# Patient Record
Sex: MALE | Race: WHITE | Employment: FULL TIME | URBAN - METROPOLITAN AREA
[De-identification: names, ages, dates, MRNs, and addresses within clinical notes are randomized per-mention and may not be internally consistent; named-entity substitution may affect disease eponyms.]

---

## 2017-12-06 ENCOUNTER — APPOINTMENT (OUTPATIENT)
Dept: GENERAL RADIOLOGY | Age: 62
DRG: 189 | End: 2017-12-06
Attending: EMERGENCY MEDICINE
Payer: SUBSIDIZED

## 2017-12-06 ENCOUNTER — HOSPITAL ENCOUNTER (INPATIENT)
Age: 62
LOS: 1 days | Discharge: HOME | DRG: 189 | End: 2017-12-07
Attending: EMERGENCY MEDICINE | Admitting: INTERNAL MEDICINE
Payer: SUBSIDIZED

## 2017-12-06 DIAGNOSIS — R06.02 SOB (SHORTNESS OF BREATH): Primary | ICD-10-CM

## 2017-12-06 DIAGNOSIS — R09.02 HYPOXIA: ICD-10-CM

## 2017-12-06 PROBLEM — R06.00 DYSPNEA: Status: ACTIVE | Noted: 2017-12-06

## 2017-12-06 PROBLEM — I10 HTN (HYPERTENSION), BENIGN: Status: ACTIVE | Noted: 2017-12-06

## 2017-12-06 LAB
ALBUMIN SERPL-MCNC: 4 G/DL (ref 3.5–5)
ALBUMIN/GLOB SERPL: 1.1 {RATIO} (ref 1.1–2.2)
ALP SERPL-CCNC: 96 U/L (ref 45–117)
ALT SERPL-CCNC: 35 U/L (ref 12–78)
ANION GAP SERPL CALC-SCNC: 8 MMOL/L (ref 5–15)
AST SERPL-CCNC: 25 U/L (ref 15–37)
ATRIAL RATE: 68 BPM
B PERT DNA SPEC QL NAA+PROBE: NOT DETECTED
BASOPHILS # BLD: 0.1 K/UL (ref 0–0.1)
BASOPHILS NFR BLD: 1 % (ref 0–1)
BILIRUB SERPL-MCNC: 0.5 MG/DL (ref 0.2–1)
BUN SERPL-MCNC: 14 MG/DL (ref 6–20)
BUN/CREAT SERPL: 16 (ref 12–20)
C PNEUM DNA SPEC QL NAA+PROBE: NOT DETECTED
CALCIUM SERPL-MCNC: 9.5 MG/DL (ref 8.5–10.1)
CALCULATED P AXIS, ECG09: 47 DEGREES
CALCULATED R AXIS, ECG10: 62 DEGREES
CALCULATED T AXIS, ECG11: 62 DEGREES
CHLORIDE SERPL-SCNC: 101 MMOL/L (ref 97–108)
CO2 SERPL-SCNC: 27 MMOL/L (ref 21–32)
CREAT SERPL-MCNC: 0.89 MG/DL (ref 0.7–1.3)
DIAGNOSIS, 93000: NORMAL
EOSINOPHIL # BLD: 0.9 K/UL (ref 0–0.4)
EOSINOPHIL NFR BLD: 14 % (ref 0–7)
ERYTHROCYTE [DISTWIDTH] IN BLOOD BY AUTOMATED COUNT: 12.6 % (ref 11.5–14.5)
FLUAV AG NPH QL IA: NEGATIVE
FLUAV H1 2009 PAND RNA SPEC QL NAA+PROBE: NOT DETECTED
FLUAV H1 RNA SPEC QL NAA+PROBE: NOT DETECTED
FLUAV H3 RNA SPEC QL NAA+PROBE: NOT DETECTED
FLUAV SUBTYP SPEC NAA+PROBE: NOT DETECTED
FLUBV AG NOSE QL IA: NEGATIVE
FLUBV RNA SPEC QL NAA+PROBE: NOT DETECTED
GLOBULIN SER CALC-MCNC: 3.5 G/DL (ref 2–4)
GLUCOSE SERPL-MCNC: 100 MG/DL (ref 65–100)
HADV DNA SPEC QL NAA+PROBE: NOT DETECTED
HCOV 229E RNA SPEC QL NAA+PROBE: NOT DETECTED
HCOV HKU1 RNA SPEC QL NAA+PROBE: NOT DETECTED
HCOV NL63 RNA SPEC QL NAA+PROBE: NOT DETECTED
HCOV OC43 RNA SPEC QL NAA+PROBE: NOT DETECTED
HCT VFR BLD AUTO: 44.4 % (ref 36.6–50.3)
HGB BLD-MCNC: 16.2 G/DL (ref 12.1–17)
HMPV RNA SPEC QL NAA+PROBE: NOT DETECTED
HPIV1 RNA SPEC QL NAA+PROBE: NOT DETECTED
HPIV2 RNA SPEC QL NAA+PROBE: NOT DETECTED
HPIV3 RNA SPEC QL NAA+PROBE: NOT DETECTED
HPIV4 RNA SPEC QL NAA+PROBE: NOT DETECTED
LYMPHOCYTES # BLD: 1.6 K/UL (ref 0.8–3.5)
LYMPHOCYTES NFR BLD: 24 % (ref 12–49)
M PNEUMO DNA SPEC QL NAA+PROBE: NOT DETECTED
MAGNESIUM SERPL-MCNC: 2.1 MG/DL (ref 1.6–2.4)
MCH RBC QN AUTO: 32.7 PG (ref 26–34)
MCHC RBC AUTO-ENTMCNC: 36.5 G/DL (ref 30–36.5)
MCV RBC AUTO: 89.5 FL (ref 80–99)
MONOCYTES # BLD: 0.5 K/UL (ref 0–1)
MONOCYTES NFR BLD: 7 % (ref 5–13)
NEUTS SEG # BLD: 3.7 K/UL (ref 1.8–8)
NEUTS SEG NFR BLD: 54 % (ref 32–75)
P-R INTERVAL, ECG05: 146 MS
PLATELET # BLD AUTO: 244 K/UL (ref 150–400)
POTASSIUM SERPL-SCNC: 4.4 MMOL/L (ref 3.5–5.1)
PROT SERPL-MCNC: 7.5 G/DL (ref 6.4–8.2)
Q-T INTERVAL, ECG07: 372 MS
QRS DURATION, ECG06: 70 MS
QTC CALCULATION (BEZET), ECG08: 395 MS
RBC # BLD AUTO: 4.96 M/UL (ref 4.1–5.7)
RSV RNA SPEC QL NAA+PROBE: NOT DETECTED
RV+EV RNA SPEC QL NAA+PROBE: NOT DETECTED
SODIUM SERPL-SCNC: 136 MMOL/L (ref 136–145)
VENTRICULAR RATE, ECG03: 68 BPM
WBC # BLD AUTO: 6.8 K/UL (ref 4.1–11.1)

## 2017-12-06 PROCEDURE — 83735 ASSAY OF MAGNESIUM: CPT | Performed by: EMERGENCY MEDICINE

## 2017-12-06 PROCEDURE — 77010033678 HC OXYGEN DAILY

## 2017-12-06 PROCEDURE — 71010 XR CHEST PORT: CPT

## 2017-12-06 PROCEDURE — 77030027138 HC INCENT SPIROMETER -A

## 2017-12-06 PROCEDURE — 94761 N-INVAS EAR/PLS OXIMETRY MLT: CPT

## 2017-12-06 PROCEDURE — 74011250636 HC RX REV CODE- 250/636: Performed by: INTERNAL MEDICINE

## 2017-12-06 PROCEDURE — 80053 COMPREHEN METABOLIC PANEL: CPT | Performed by: EMERGENCY MEDICINE

## 2017-12-06 PROCEDURE — 36415 COLL VENOUS BLD VENIPUNCTURE: CPT | Performed by: EMERGENCY MEDICINE

## 2017-12-06 PROCEDURE — 74011250636 HC RX REV CODE- 250/636: Performed by: EMERGENCY MEDICINE

## 2017-12-06 PROCEDURE — 87804 INFLUENZA ASSAY W/OPTIC: CPT | Performed by: EMERGENCY MEDICINE

## 2017-12-06 PROCEDURE — 74011250637 HC RX REV CODE- 250/637: Performed by: INTERNAL MEDICINE

## 2017-12-06 PROCEDURE — 74011250637 HC RX REV CODE- 250/637: Performed by: NURSE PRACTITIONER

## 2017-12-06 PROCEDURE — 94640 AIRWAY INHALATION TREATMENT: CPT

## 2017-12-06 PROCEDURE — 96374 THER/PROPH/DIAG INJ IV PUSH: CPT

## 2017-12-06 PROCEDURE — 74011250636 HC RX REV CODE- 250/636: Performed by: NURSE PRACTITIONER

## 2017-12-06 PROCEDURE — 74011000250 HC RX REV CODE- 250: Performed by: EMERGENCY MEDICINE

## 2017-12-06 PROCEDURE — 77030013140 HC MSK NEB VYRM -A

## 2017-12-06 PROCEDURE — 94762 N-INVAS EAR/PLS OXIMTRY CONT: CPT

## 2017-12-06 PROCEDURE — 85025 COMPLETE CBC W/AUTO DIFF WBC: CPT | Performed by: EMERGENCY MEDICINE

## 2017-12-06 PROCEDURE — 87798 DETECT AGENT NOS DNA AMP: CPT | Performed by: NURSE PRACTITIONER

## 2017-12-06 PROCEDURE — 93005 ELECTROCARDIOGRAM TRACING: CPT

## 2017-12-06 PROCEDURE — 65660000000 HC RM CCU STEPDOWN

## 2017-12-06 PROCEDURE — 99284 EMERGENCY DEPT VISIT MOD MDM: CPT

## 2017-12-06 PROCEDURE — 74011000250 HC RX REV CODE- 250: Performed by: INTERNAL MEDICINE

## 2017-12-06 RX ORDER — ACETAMINOPHEN 325 MG/1
650 TABLET ORAL
Status: DISCONTINUED | OUTPATIENT
Start: 2017-12-06 | End: 2017-12-07 | Stop reason: HOSPADM

## 2017-12-06 RX ORDER — IPRATROPIUM BROMIDE AND ALBUTEROL SULFATE 2.5; .5 MG/3ML; MG/3ML
3 SOLUTION RESPIRATORY (INHALATION)
Status: DISCONTINUED | OUTPATIENT
Start: 2017-12-06 | End: 2017-12-07 | Stop reason: HOSPADM

## 2017-12-06 RX ORDER — PROCHLORPERAZINE EDISYLATE 5 MG/ML
5 INJECTION INTRAMUSCULAR; INTRAVENOUS
Status: DISCONTINUED | OUTPATIENT
Start: 2017-12-06 | End: 2017-12-06 | Stop reason: CLARIF

## 2017-12-06 RX ORDER — SODIUM CHLORIDE 0.9 % (FLUSH) 0.9 %
5-10 SYRINGE (ML) INJECTION EVERY 8 HOURS
Status: DISCONTINUED | OUTPATIENT
Start: 2017-12-06 | End: 2017-12-07 | Stop reason: HOSPADM

## 2017-12-06 RX ORDER — HYDRALAZINE HYDROCHLORIDE 20 MG/ML
10 INJECTION INTRAMUSCULAR; INTRAVENOUS
Status: DISCONTINUED | OUTPATIENT
Start: 2017-12-06 | End: 2017-12-07 | Stop reason: HOSPADM

## 2017-12-06 RX ORDER — ENOXAPARIN SODIUM 100 MG/ML
40 INJECTION SUBCUTANEOUS EVERY 24 HOURS
Status: DISCONTINUED | OUTPATIENT
Start: 2017-12-06 | End: 2017-12-07 | Stop reason: HOSPADM

## 2017-12-06 RX ORDER — GUAIFENESIN 600 MG/1
600 TABLET, EXTENDED RELEASE ORAL EVERY 12 HOURS
Status: DISCONTINUED | OUTPATIENT
Start: 2017-12-06 | End: 2017-12-07 | Stop reason: HOSPADM

## 2017-12-06 RX ORDER — MORPHINE SULFATE 2 MG/ML
1 INJECTION, SOLUTION INTRAMUSCULAR; INTRAVENOUS
Status: DISCONTINUED | OUTPATIENT
Start: 2017-12-06 | End: 2017-12-07 | Stop reason: HOSPADM

## 2017-12-06 RX ORDER — VITAMIN E 1000 UNIT
1000 CAPSULE ORAL
COMMUNITY

## 2017-12-06 RX ORDER — SODIUM CHLORIDE 0.9 % (FLUSH) 0.9 %
5-10 SYRINGE (ML) INJECTION AS NEEDED
Status: DISCONTINUED | OUTPATIENT
Start: 2017-12-06 | End: 2017-12-07 | Stop reason: HOSPADM

## 2017-12-06 RX ORDER — NALOXONE HYDROCHLORIDE 0.4 MG/ML
0.4 INJECTION, SOLUTION INTRAMUSCULAR; INTRAVENOUS; SUBCUTANEOUS AS NEEDED
Status: DISCONTINUED | OUTPATIENT
Start: 2017-12-06 | End: 2017-12-07 | Stop reason: HOSPADM

## 2017-12-06 RX ADMIN — AZITHROMYCIN MONOHYDRATE 500 MG: 500 INJECTION, POWDER, LYOPHILIZED, FOR SOLUTION INTRAVENOUS at 14:52

## 2017-12-06 RX ADMIN — Medication 10 ML: at 06:47

## 2017-12-06 RX ADMIN — METHYLPREDNISOLONE SODIUM SUCCINATE 40 MG: 40 INJECTION, POWDER, FOR SOLUTION INTRAMUSCULAR; INTRAVENOUS at 21:02

## 2017-12-06 RX ADMIN — METHYLPREDNISOLONE SODIUM SUCCINATE 40 MG: 40 INJECTION, POWDER, FOR SOLUTION INTRAMUSCULAR; INTRAVENOUS at 06:47

## 2017-12-06 RX ADMIN — GUAIFENESIN 600 MG: 600 TABLET, EXTENDED RELEASE ORAL at 21:02

## 2017-12-06 RX ADMIN — ENOXAPARIN SODIUM 40 MG: 40 INJECTION SUBCUTANEOUS at 08:39

## 2017-12-06 RX ADMIN — METHYLPREDNISOLONE SODIUM SUCCINATE 40 MG: 40 INJECTION, POWDER, FOR SOLUTION INTRAMUSCULAR; INTRAVENOUS at 13:01

## 2017-12-06 RX ADMIN — IPRATROPIUM BROMIDE AND ALBUTEROL SULFATE 3 ML: .5; 3 SOLUTION RESPIRATORY (INHALATION) at 13:37

## 2017-12-06 RX ADMIN — Medication 10 ML: at 22:00

## 2017-12-06 RX ADMIN — Medication 10 ML: at 13:01

## 2017-12-06 RX ADMIN — IPRATROPIUM BROMIDE AND ALBUTEROL SULFATE 3 ML: .5; 3 SOLUTION RESPIRATORY (INHALATION) at 19:35

## 2017-12-06 RX ADMIN — METHYLPREDNISOLONE SODIUM SUCCINATE 125 MG: 125 INJECTION, POWDER, FOR SOLUTION INTRAMUSCULAR; INTRAVENOUS at 02:40

## 2017-12-06 RX ADMIN — ALBUTEROL SULFATE 1 DOSE: 2.5 SOLUTION RESPIRATORY (INHALATION) at 02:37

## 2017-12-06 RX ADMIN — GUAIFENESIN 600 MG: 600 TABLET, EXTENDED RELEASE ORAL at 14:53

## 2017-12-06 RX ADMIN — IPRATROPIUM BROMIDE AND ALBUTEROL SULFATE 3 ML: .5; 3 SOLUTION RESPIRATORY (INHALATION) at 08:08

## 2017-12-06 RX ADMIN — ACETAMINOPHEN 650 MG: 325 TABLET ORAL at 13:01

## 2017-12-06 RX ADMIN — ALBUTEROL SULFATE 1 DOSE: 2.5 SOLUTION RESPIRATORY (INHALATION) at 03:10

## 2017-12-06 NOTE — PROGRESS NOTES
12/7/2017 2:54 PM Pt's RN notified. No new needs. MANDO Laguerre     12/7/2017 2:46 PM SHELL spoke with Ryan Macias with [facility] regarding the pt's Care Card Application. Pt does not qualify for Medicaid. Pt's wife states that there is a 120 day hold on their application. Pt and wife have been instructed to contact [facility] to find out why and if there is additional information needed. No new needs. Armen Laguerre     12/7/2017 2:41 PM Discussed with the pt. Pt agreeable. Referral sent for [facility]. Armen Laguerre     12/7/2017 2:32 PM Orders received for [facility] for COPD. MANDO Laguerre     12/6/2017 5:22 PM Contacted Bridger Dillard scheduling this afternoon to arrange a PCP appointment. Pt has a new patient appointment for Monday December 11th at 11:00AM with NP Abbie Pringle. MANDO Laguerre     12/6/2017 5:13 PM CM spoke with pt to complete assessment and discuss discharge plans. Pt lives with his wife in a 1 story home with 2 steps to enter. Pt has confirmed his address and phone number are correct. Pt reports that prior to admission, he was independent with adls. Pt has not  been using DME. Previously, pt has not had home health. Pt does not have a PCP, but is agreeable to getting one. Pt gets meds filled at [address] [facility]. PCP appointment is the discharge need anticipated. Plan is for transport home by his family. Pt confirms that he has been visited by a financial screener. CM will continue to follow and assist as needed.     MANDO Laguerre

## 2017-12-06 NOTE — PROGRESS NOTES
Primary Nurse Osvaldo Stovall RN and Julio Caro RN performed a dual skin assessment on this patient No impairment noted  Rigo score is 23

## 2017-12-06 NOTE — ED NOTES
Bedside and Verbal shift change report given to Yumiko Bennett RN (oncoming nurse) by Carlos Agarwal RN (offgoing nurse). Report included the following information SBAR, Kardex, MAR, Recent Results and Cardiac Rhythm NSR.

## 2017-12-06 NOTE — PROGRESS NOTES
TRANSFER - IN REPORT:    Verbal report received from HIRA Bolivar(name) on Kristi Berumen  being received from ED(unit) for routine progression of care      Report consisted of patients Situation, Background, Assessment and   Recommendations(SBAR). Information from the following report(s) SBAR, Kardex, ED Summary, Procedure Summary, Intake/Output, MAR, Recent Results and Med Rec Status was reviewed with the receiving nurse. Opportunity for questions and clarification was provided. Assessment completed upon patients arrival to unit and care assumed.

## 2017-12-06 NOTE — IP AVS SNAPSHOT
Segundo Contreras 
 
 
 [address] 
[phone] Patient: Veena Lo MRN: [id number] LWB:95/4/2071 My Medications TAKE these medications as instructed Instructions Each Dose to Equal  
 Morning Noon Evening Bedtime  
 albuterol sulfate 90 mcg/actuation Aepb Your last dose was: Your next dose is: Take 2 Puffs by inhalation four (4) times daily as needed. 2 Puff  
    
   
   
   
  
 azithromycin 250 mg tablet Commonly known as:  Gamal Ambrose Your last dose was: Your next dose is: Take 1 Tab by mouth daily for 4 days. 250 mg  
    
   
   
   
  
 guaiFENesin  mg ER tablet Commonly known as:  eegoes Your last dose was: Your next dose is: Take 1 Tab by mouth every twelve (12) hours for 10 days. 600 mg  
    
   
   
   
  
 multivitamin tablet Commonly known as:  ONE A DAY Your last dose was: Your next dose is: Take 1 Tab by mouth daily. 1 Tab OTHER Your last dose was: Your next dose is:    
   
   
 Olive leaf one capsule by mouth daily, Biocleanse one capsule by mouth every evening  
     
   
   
   
  
 predniSONE 20 mg tablet Commonly known as:  Yogesh Violettey Your last dose was: Your next dose is: Take 1 Tab by mouth two (2) times daily (with meals) for 3 days. 20 mg PROBIOTIC 4X 10-15 mg Tbec Generic drug:  B.infantis-B.ani-B.long-B.bifi Your last dose was: Your next dose is: Take 1 Cap by mouth nightly. 1 Cap VITAMIN C 1,000 mg tablet Generic drug:  ascorbic acid (vitamin C) Your last dose was: Your next dose is: Take 1,000 mg by mouth nightly. 1000 mg Where to Get Your Medications Information on where to get these meds will be given to you by the nurse or doctor. ! Ask your nurse or doctor about these medications  
  albuterol sulfate 90 mcg/actuation Aepb  
 azithromycin 250 mg tablet  
 guaiFENesin  mg ER tablet  
 predniSONE 20 mg tablet

## 2017-12-06 NOTE — CDMP QUERY
=>Underweight with BMI 18.7   =>Other Explanation of clinical findings  =>Unable to Determine (no explanation of clinical findings)    The medical record reflects the following clinical findings, treatment, and risk factors:  Risk Factors: 61 yo M admitted with dyspnea and hypoxia  Clinical Indicators: Ht 6'1 Wt 141 lb BMI 18   Treatment: Please specify if this pt is underweight or  has any nutritional deficiencies     Please clarify and document your clinical opinion in the progress notes and discharge summary including the definitive and/or presumptive diagnosis, (suspected or probable), related to the above clinical findings. Please include clinical findings supporting your diagnosis.     Thank you for your time   [facility] RN/BSN, [address] [place] [address]  Desk:   [phone]   Other:  [phone]

## 2017-12-06 NOTE — PROGRESS NOTES
12/06/17 1830 12/06/17 1832 12/06/17 1835   RT Walking Oximetry   Stage Resting (Room Air) During Walk (Room Air) During Walk (Room Air)   SpO2 96 % 95 % 93 %   HR 90 bpm 95 bpm 99 bpm   Rate of Dyspnea 0 0 0   O2 Device None (Room air) None (Room air) None (Room air)       12/06/17 1838 12/06/17 1839   RT Walking Oximetry   Stage During Walk (Room Air) After Walk   SpO2 94 % 95 %   HR 97 bpm 92 bpm   Rate of Dyspnea 1 0   O2 Device None (Room air) None (Room air)

## 2017-12-06 NOTE — PROGRESS NOTES
Bedside and Verbal shift change report given to Ramon Leiva (oncoming nurse) by Vinicio Kaiser (offgoing nurse). Report included the following information SBAR, Kardex, Procedure Summary, Intake/Output, MAR, Recent Results and Med Rec Status.

## 2017-12-06 NOTE — PROGRESS NOTES
[facility]: MED RECONCILIATION    Comments/Recommendations:   Patient is awake and alert  Verifies no known allergies    Medications added:     · Probiotic  · Olive leaf  · Bio-cleanse  · Vitamin C    Medications removed:    · Oxycodone-apap  · Promethazine  · Tumeric root    Medications adjusted:    · None    Allergies: Review of patient's allergies indicates no known allergies. Prior to Admission Medications:     Prior to Admission Medications   Prescriptions Last Dose Informant Patient Reported? Taking? B.infantis-B.ani-B.long-B.bifi (PROBIOTIC 4X) 10-15 mg TbEC 12/4/2017 Self Yes Yes   Sig: Take 1 Cap by mouth nightly. OTHER 12/4/2017 Self Yes Yes   Sig: Olive leaf one capsule by mouth daily, Biocleanse one capsule by mouth every evening   ascorbic acid, vitamin C, (VITAMIN C) 1,000 mg tablet 12/5/2017 at 1000pm Self Yes Yes   Sig: Take 1,000 mg by mouth nightly. multivitamin (ONE A DAY) tablet 12/4/2017 at pm Self Yes Yes   Sig: Take 1 Tab by mouth daily.         Facility-Administered Medications: None      Thank you,    Zan Holt, PharmD, BCPS

## 2017-12-06 NOTE — ROUTINE PROCESS
TRANSFER - OUT REPORT:    Verbal report given to Natalie RN(name) on Fátima Byers  being transferred to 5th Floor(unit) for routine progression of care       Report consisted of patients Situation, Background, Assessment and   Recommendations(SBAR). Information from the following report(s) ED Summary was reviewed with the receiving nurse. Lines:   Peripheral IV 12/06/17 Left Antecubital (Active)   Site Assessment Clean, dry, & intact 12/6/2017  2:40 AM   Phlebitis Assessment 0 12/6/2017  2:40 AM   Infiltration Assessment 0 12/6/2017  2:40 AM   Dressing Status Clean, dry, & intact 12/6/2017  2:40 AM   Dressing Type Tape;Transparent 12/6/2017  2:40 AM   Hub Color/Line Status Patent; Flushed;Pink 12/6/2017  2:40 AM   Action Taken Blood drawn 12/6/2017  2:40 AM        Opportunity for questions and clarification was provided.       Patient transported with:   O2 @ 4 liters  Tech

## 2017-12-06 NOTE — CONSULTS
Name: [facility]: [facility]   : 1955 Admit Date: 2017   Phone: [phone]  Room: 0/26   PCP: Not On File Parag  MRN: [id number]   Date: 2017  Code: Full Code        HPI:      Chart and notes reviewed. Data reviewed. I review the patient's current medications in the medical record at each encounter.  I have evaluated and examined the patient. 1:45 PM       History was obtained from patient. I was asked by Kamala Wharton MD to see Jose Alfredo Bermudez in consultation for a chief complaint of possible COPD. History of Present Illness:   is a pleasant, 60year old gentleman that presented to the [facility] ED last evening with worsening cough and shortness of breath. He reports that over the last 3 days, he started having a cough. Yesterday, he got to a point where he felt that he could not breathe and thus presented to the ED. He denies fever or chills. He was around a coworker that was sick. Endorses wheezing and cough productive of clear phlegm. He is a former smoker of cigarettes for 10 years: he smoked 1ppd until age 28. However, three years ago he picked up cigars and has been smoking three cigars every day. He denies history of past lung disease. 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 He does report that his brother has emphysema and had throat cancer. His mother and father were both smokers and he was around heavy second hand smoke for many years as a child. His other PMH includes abdominal hernia repair and tonsillectomy. Upon arrival to the ED, he was found to be hypoxic to 88% on RA. He was started on supplemental oxygen, steroids, and nebs and admitted for further management. Images:  Personally reviewed. Hyperexpanded lung fields    WBC 6.8  Hgb 16.2  EOS . 9  Influenza negative  EKG with NSR    No past medical history on file.     Past Surgical History:   Procedure Laterality Date    HX HERNIA REPAIR      HX TONSILLECTOMY         No family history on file. Social History   Substance Use Topics    Smoking status: Current Some Day Smoker     Types: Cigars    Smokeless tobacco: Never Used    Alcohol use No       No Known Allergies    Current Facility-Administered Medications   Medication Dose Route Frequency    sodium chloride (NS) flush 5-10 mL  5-10 mL IntraVENous Q8H    sodium chloride (NS) flush 5-10 mL  5-10 mL IntraVENous PRN    acetaminophen (TYLENOL) tablet 650 mg  650 mg Oral Q4H PRN    morphine injection 1 mg  1 mg IntraVENous Q4H PRN    naloxone (NARCAN) injection 0.4 mg  0.4 mg IntraVENous PRN    enoxaparin (LOVENOX) injection 40 mg  40 mg SubCUTAneous Q24H    methylPREDNISolone (PF) (SOLU-MEDROL) injection 40 mg  40 mg IntraVENous Q8H    albuterol-ipratropium (DUO-NEB) 2.5 MG-0.5 MG/3 ML  3 mL Nebulization Q6H RT    promethazine (PHENERGAN) 12.5 mg in NS 50 mL IVPB  12.5 mg IntraVENous Q8H PRN    hydrALAZINE (APRESOLINE) 20 mg/mL injection 10 mg  10 mg IntraVENous Q6H PRN         REVIEW OF SYSTEMS   Negative except as stated in the HPI. Physical Exam:   Visit Vitals    /77 (BP 1 Location: Right arm, BP Patient Position: At rest)    Pulse 76    Temp 97.6 °F (36.4 °C)    Resp 18    Ht 6' 1\" (1.854 m)    Wt 64.2 kg (141 lb 8.6 oz)    SpO2 94%    BMI 18.67 kg/m2       General:  Alert, cooperative, no distress, appears stated age. Head:  Normocephalic, without obvious abnormality, atraumatic. Eyes:  Conjunctivae/corneas clear. Nose: Nares normal. Septum midline. Mucosa normal.    Throat: Lips, mucosa, and tongue normal. Teeth and gums normal.   Neck: Supple, symmetrical, trachea midline, no adenopathy   Lungs:   Decreased bs bilaterally, fair air movement, no rhonchi/wheezing heard   Chest wall:  No tenderness or deformity. Heart:  Regular rate and rhythm, S1, S2 normal, no murmur, click, rub or gallop. Abdomen:   Soft, non-tender.  Bowel sounds normal.    Extremities: Extremities normal, atraumatic, no cyanosis or edema. Pulses: 2+ and symmetric all extremities. Skin: Skin color, texture, turgor normal. No rashes or lesions   Lymph nodes: Cervical  nodes normal.   Neurologic: Grossly nonfocal       Lab Results   Component Value Date/Time    Sodium 136 12/06/2017 02:38 AM    Potassium 4.4 12/06/2017 02:38 AM    Chloride 101 12/06/2017 02:38 AM    CO2 27 12/06/2017 02:38 AM    BUN 14 12/06/2017 02:38 AM    Creatinine 0.89 12/06/2017 02:38 AM    Glucose 100 12/06/2017 02:38 AM    Calcium 9.5 12/06/2017 02:38 AM    Magnesium 2.1 12/06/2017 02:38 AM       Lab Results   Component Value Date/Time    WBC 6.8 12/06/2017 02:38 AM    HGB 16.2 12/06/2017 02:38 AM    PLATELET 721 06/74/5061 02:38 AM    MCV 89.5 12/06/2017 02:38 AM       Lab Results   Component Value Date/Time    AST (SGOT) 25 12/06/2017 02:38 AM    Alk. phosphatase 96 12/06/2017 02:38 AM    Protein, total 7.5 12/06/2017 02:38 AM    Albumin 4.0 12/06/2017 02:38 AM    Globulin 3.5 12/06/2017 02:38 AM       No results found for: IRON, FE, TIBC, IBCT, PSAT, FERR    No results found for: SR, CRP, COREEN, ANAIGG, RA, RPR, RPRT, VDRLT, VDRLS, TSH, TSHEXT     No results found for: PH, PHI, PCO2, PCO2I, PO2, PO2I, HCO3, HCO3I, FIO2, FIO2I    No results found for: CPK, RCK1, RCK2, RCK3, RCK4, CKNDX, CKND1, TROPT, TROIQ, BNPP, BNP     No results found for: CULT    No results found for: TOXA1, RPR, HBCM, HBSAG, HAAB, HCAB1, HAAT, G6PD, CRYAC, HIVGT, HIVR, HIV1, HIV12, HIVPC, HIVRPI    No results found for: VANCT, CPK    No results found for: COLOR, APPRN, SPGRU, ANAID, PROTU, GLUCU, KETU, BILU, BLDU, UROU, JEM, LEUKU, WBCU, RBCU, UEPI, BACTU, CASTS, UCRY    IMPRESSION  · Acute Respiratory Failure with Hypoxia  · Wheezing; with potential underlying Asthma/ COPD exacerbation  · Acute Bronchitis  · Elevated Eosinophils  · Former Smoker  · Elevated Blood Pressure    PLAN  · Supplemental O2 to keep sats >90%.   Will need 6 minute walk prior to discharge. · Agree with scheduled nebs and Solu-Medrol  · Add Azithromycin  · Check viral panel  · Add Mucinex  · Would consider adding LABA/LAMA such as Anoro or Stiolto or Bevespi prior to discharge  · Continued smoking cessation discussed and encouraged  · Patient and wife expressed concerns about affording inhalers: will refer to our research branch to see if he qualifies for any studies    Thank you very much for the consult. We will follow along with you in Mr. Charles's care.       Johana Cruz NP

## 2017-12-06 NOTE — IP AVS SNAPSHOT
Vinay Vipin 
 
 
 [address] 
[phone] Patient: Tisha Kilpatrick MRN: [id number] WVB:[id number] About your hospitalization You were admitted on:  December 6, 2017 You last received care in the:  [facility] 5M1 MED SURG 1 You were discharged on:  December 7, 2017 Why you were hospitalized Your primary diagnosis was:  Not on File Your diagnoses also included:  Dyspnea, Htn (Hypertension), Benign, Hypoxia Things You Need To Do (next 8 weeks) Schedule an appointment with Jamilah Bran MD as soon as possible for a visit in 1 month(s)  
for lung testing Phone:  [phone] Where:  [address] [facility], [address] [facility] [address], [facility] 7 [address] Monday Dec 11, 2017 New Patient with Tata Butts NP at 11:00 AM  
Where:  [address] [facility] ([facility]) Discharge Orders None A check celestina indicates which time of day the medication should be taken. My Medications TAKE these medications as instructed Instructions Each Dose to Equal  
 Morning Noon Evening Bedtime  
 albuterol sulfate 90 mcg/actuation Aepb Your last dose was: Your next dose is: Take 2 Puffs by inhalation four (4) times daily as needed. 2 Puff  
    
   
   
   
  
 azithromycin 250 mg tablet Commonly known as:  Letamacrina Aleman Your last dose was: Your next dose is: Take 1 Tab by mouth daily for 4 days. 250 mg  
    
   
   
   
  
 guaiFENesin  mg ER tablet Commonly known as:  Charan & Charan Your last dose was: Your next dose is: Take 1 Tab by mouth every twelve (12) hours for 10 days. 600 mg  
    
   
   
   
  
 multivitamin tablet Commonly known as:  ONE A DAY Your last dose was: Your next dose is: Take 1 Tab by mouth daily. 1 Tab  OTHER  
   
 Your last dose was: Your next dose is:    
   
   
 Olive leaf one capsule by mouth daily, Biocleanse one capsule by mouth every evening  
     
   
   
   
  
 predniSONE 20 mg tablet Commonly known as:  Alen Hilton Your last dose was: Your next dose is: Take 1 Tab by mouth two (2) times daily (with meals) for 3 days. 20 mg PROBIOTIC 4X 10-15 mg Tbec Generic drug:  B.infantis-B.ani-B.long-B.bifi Your last dose was: Your next dose is: Take 1 Cap by mouth nightly. 1 Cap VITAMIN C 1,000 mg tablet Generic drug:  ascorbic acid (vitamin C) Your last dose was: Your next dose is: Take 1,000 mg by mouth nightly. 1000 mg Where to Get Your Medications Information on where to get these meds will be given to you by the nurse or doctor. ! Ask your nurse or doctor about these medications  
  albuterol sulfate 90 mcg/actuation Aepb  
 azithromycin 250 mg tablet  
 guaiFENesin  mg ER tablet  
 predniSONE 20 mg tablet Discharge Instructions Patient Discharge Instructions Dong Moon / [id number] : 1955 Admitted 2017 Discharged: 2017 Primary Diagnoses Problem List as of 2017  Date Reviewed: 2017 Codes Class Noted - Resolved Dyspnea HTN (hypertension), benign Hypoxia COPD Take Home Medications · It is important that you take the medication exactly as they are prescribed. · Keep your medication in the bottles provided by the pharmacist and keep a list of the medication names, dosages, and times to be taken in your wallet. · Do not take other medications without consulting your doctor. What to do at [facility] Recommended diet: Regular Diet Recommended activity: Activity as tolerated If you experience worse breathing, please follow up with lung doctors. Follow-up with your PCP in a few weeks Chronic Obstructive Pulmonary Disease (COPD): Care Instructions Your Care Instructions Chronic obstructive pulmonary disease (COPD) is a general term for a group of lung diseases, including emphysema and chronic bronchitis. People with COPD have decreased airflow in and out of the lungs, which makes it hard to breathe. The airways also can get clogged with thick mucus. Cigarette smoking is a major cause of COPD. Although there is no cure for COPD, you can slow its progress. Following your treatment plan and taking care of yourself can help you feel better and live longer. Follow-up care is a key part of your treatment and safety. Be sure to make and go to all appointments, and call your doctor if you are having problems. It's also a good idea to know your test results and keep a list of the medicines you take. How can you care for yourself at home? ?Staying healthy ? · Do not smoke. This is the most important step you can take to prevent more damage to your lungs. If you need help quitting, talk to your doctor about stop-smoking programs and medicines. These can increase your chances of quitting for good. ? · Avoid colds and flu. Get a pneumococcal vaccine shot. If you have had one before, ask your doctor whether you need a second dose. Get the flu vaccine every fall. If you must be around people with colds or the flu, wash your hands often. ? · Avoid secondhand smoke, air pollution, and high altitudes. Also avoid cold, dry air and hot, humid air. Stay at home with your windows closed when air pollution is bad. ?Medicines and oxygen therapy ? · Take your medicines exactly as prescribed. Call your doctor if you think you are having a problem with your medicine. ? · You may be taking medicines such as: ¨ Bronchodilators. These help open your airways and make breathing easier. Bronchodilators are either short-acting (work for 6 to 9 hours) or long-acting (work for 24 hours). You inhale most bronchodilators, so they start to act quickly. Always carry your quick-relief inhaler with you in case you need it while you are away from home. ¨ Corticosteroids (prednisone, budesonide). These reduce airway inflammation. They come in pill or inhaled form. You must take these medicines every day for them to work well. ? · A spacer may help you get more inhaled medicine to your lungs. Ask your doctor or pharmacist if a spacer is right for you. If it is, ask how to use it properly. ? · Do not take any vitamins, over-the-counter medicine, or herbal products without talking to your doctor first.  
? · If your doctor prescribed antibiotics, take them as directed. Do not stop taking them just because you feel better. You need to take the full course of antibiotics. ? · Oxygen therapy boosts the amount of oxygen in your blood and helps you breathe easier. Use the flow rate your doctor has recommended, and do not change it without talking to your doctor first.  
Activity ? · Get regular exercise. Walking is an easy way to get exercise. Start out slowly, and walk a little more each day. ? · Pay attention to your breathing. You are exercising too hard if you cannot talk while you are exercising. ? · Take short rest breaks when doing household chores and other activities. ? · Learn breathing methods-such as breathing through pursed lips-to help you become less short of breath. ? · If your doctor has not set you up with a pulmonary rehabilitation program, talk to him or her about whether rehab is right for you. Rehab includes exercise programs, education about your disease and how to manage it, help with diet and other changes, and emotional support. Diet ? · Eat regular, healthy meals.  Use bronchodilators about 1 hour before you eat to make it easier to eat. Eat several small meals instead of three large ones. Drink beverages at the end of the meal. Avoid foods that are hard to chew. ? · Eat foods that contain protein so that you do not lose muscle mass. ? · Talk with your doctor if you gain too much weight or if you lose weight without trying. ?Mental health ? · Talk to your family, friends, or a therapist about your feelings. It is normal to feel frightened, angry, hopeless, helpless, and even guilty. Talking openly about bad feelings can help you cope. If these feelings last, talk to your doctor. When should you call for help? Call 911 anytime you think you may need emergency care. For example, call if: 
? · You have severe trouble breathing. ?Call your doctor now or seek immediate medical care if: 
? · You have new or worse trouble breathing. ? · You cough up blood. ? · You have a fever. ? Watch closely for changes in your health, and be sure to contact your doctor if: 
? · You cough more deeply or more often, especially if you notice more mucus or a change in the color of your mucus. ? · You have new or worse swelling in your legs or belly. ? · You are not getting better as expected. Where can you learn more? Go to http://heaven-brayan.info/. Sharyle Cheng in the search box to learn more about \"Chronic Obstructive Pulmonary Disease (COPD): Care Instructions. \" Current as of: May 12, 2017 Content Version: 11.4 © 7519-0001 [employer]. Care instructions adapted under license by [employer] (which disclaims liability or warranty for this information). If you have questions about a medical condition or this instruction, always ask your healthcare professional. [address] any warranty or liability for your use of this information.  
 
 
Information obtained by : 
I understand that if any problems occur once I am at home I am to contact my physician. I understand and acknowledge receipt of the instructions indicated above. Physician's or R.N.'s Signature                                                                  Date/Time Patient or Representative Signature                                                          Date/Time [employer] Announcement We are excited to announce that we are making your provider's discharge notes available to you in [employer]. You will see these notes when they are completed and signed by the physician that discharged you from your recent hospital stay. If you have any questions or concerns about any information you see in [employer], please call the Health Information Department where you were seen or reach out to your Primary Care Provider for more information about your plan of care. Introducing [facility]! Evon Reich introduces [employer] patient portal. Now you can access parts of your medical record, email your doctor's office, and request medication refills online. 1. In your internet browser, go to [email or web address] 2. Click on the First Time User? Click Here link in the Sign In box. You will see the New Member Sign Up page. 3. Enter your [employer] Access Code exactly as it appears below. You will not need to use this code after youve completed the sign-up process. If you do not sign up before the expiration date, you must request a new code. · [employer] Access Code: [email or web address] Expires: 3/7/2018 12:01 PM 
 
4. Enter the last four digits of your Social Security Number (xxxx) and Date of Birth (mm/dd/yyyy) as indicated and click Submit.  You will be taken to the next sign-up page. 5. Create a [employer] ID. This will be your [employer] login ID and cannot be changed, so think of one that is secure and easy to remember. 6. Create a [employer] password. You can change your password at any time. 7. Enter your Password Reset Question and Answer. This can be used at a later time if you forget your password. 8. Enter your e-mail address. You will receive e-mail notification when new information is available in [address] 9. Click Sign Up. You can now view and download portions of your medical record. 10. Click the Download Summary menu link to download a portable copy of your medical information. If you have questions, please visit the Frequently Asked Questions section of the [employer] website. Remember, [employer] is NOT to be used for urgent needs. For medical emergencies, dial 911. Now available from your iPhone and Android! Providers Seen During Your Hospitalization Provider Specialty Primary office phone Brittany Rizo, [address] [place] [address] Emergency Medicine [phone] Nedra Reed MD Internal Medicine [phone] Ofelia Barajas DO Internal Medicine [phone] Shona Gay MD Internal Medicine [phone] Your Primary Care Physician (PCP) Primary Care Physician Office Phone Office Fax UNKNOWN, PROVIDER ** None ** ** None ** You are allergic to the following No active allergies Recent Documentation Height Weight BMI Smoking Status 1.854 m 63.1 kg 18.35 kg/m2 Current Some Day Smoker Emergency Contacts Name Discharge Info Relation Home Work Mobile Mago Charles DISCHARGE CAREGIVER [3] Spouse [3] [phone] Patient Belongings The following personal items are in your possession at time of discharge: 
     Visual Aid: Glasses, With patient          Jewelry: Ring, With patient       Other Valuables: Cell Phone, At bedside, Eyeglasses, With patient Please provide this summary of care documentation to your next provider. Signatures-by signing, you are acknowledging that this After Visit Summary has been reviewed with you and you have received a copy. Patient Signature:  ____________________________________________________________ Date:  ____________________________________________________________  
  
Natalie Meredith Provider Signature:  ____________________________________________________________ Date:  ____________________________________________________________

## 2017-12-06 NOTE — ED PROVIDER NOTES
HPI Comments: 60 y.o. male with no significant past medical history who presents ambulatory to the ED accompanied by wife, with chief complaint of SOB and cough which has been progressively worsening x 4 days. Pt states that his cough has been minimally productive at home. He is also congested and has had sinus pressure, and he notes that all of his symptoms have been worse at night. Tonight when he went to sleep he became acutely dyspneic when lying down. He was unable to sleep secondary to his symptoms, so he decided to come to the ED for initial evaluation of all of the above. Pt denies significant PMHx, but he states that he just quit smoking cigars 1 month ago. Pt's SpO2 in triage noted to be 89% on RA. He denies h/o similar symptoms, and he also denies any known cardiac history. Pt specifically denies CP, muscle aches, fevers, chills, and diaphoresis. There are no other acute medical concerns at this time. Social hx: - Tobacco (former cigar smoker- quit one month ago), - EtOH, - Illicit Drug Abuse   PCP: None      Note written by Nely Patterson. Amandeep León, as dictated by Audi Biggs, DO 2:31 AM     The history is provided by the patient. No  was used. No past medical history on file. Past Surgical History:   Procedure Laterality Date    HX HERNIA REPAIR      HX TONSILLECTOMY           No family history on file. Social History     Social History    Marital status:      Spouse name: N/A    Number of children: N/A    Years of education: N/A     Occupational History    Not on file. Social History Main Topics    Smoking status: Current Some Day Smoker    Smokeless tobacco: Never Used    Alcohol use No    Drug use: No    Sexual activity: Not on file     Other Topics Concern    Not on file     Social History Narrative     ALLERGIES: Review of patient's allergies indicates no known allergies.     Review of Systems   Constitutional: Negative for appetite change, chills, diaphoresis, fever and unexpected weight change. HENT: Positive for congestion and sinus pressure. Negative for ear pain, hearing loss and trouble swallowing. Eyes: Negative for pain and visual disturbance. Respiratory: Positive for cough and shortness of breath. Cardiovascular: Negative for chest pain and palpitations. Gastrointestinal: Negative for abdominal distention, abdominal pain, blood in stool and vomiting. Genitourinary: Negative for dysuria, hematuria and urgency. Musculoskeletal: Negative for back pain and myalgias. Skin: Negative for rash. Neurological: Negative for dizziness, syncope, weakness and numbness. Psychiatric/Behavioral: Negative for confusion and suicidal ideas. All other systems reviewed and are negative. Vitals:    12/06/17 0220 12/06/17 0231 12/06/17 0300   BP: (!) 183/113  (!) 148/112   Pulse: 69  65   Resp: 18  12   Temp: 97.4 °F (36.3 °C)     SpO2: (!) 89% 91% 90%   Weight: 64.2 kg (141 lb 8.6 oz)     Height: 6' 1\" (1.854 m)              Physical Exam   Constitutional: He is oriented to person, place, and time. He appears well-developed and well-nourished. No distress. HENT:   Head: Normocephalic and atraumatic. Right Ear: External ear normal.   Left Ear: External ear normal.   Nose: Nose normal.   Mouth/Throat: Oropharynx is clear and moist. No oropharyngeal exudate. Eyes: Conjunctivae and EOM are normal. Pupils are equal, round, and reactive to light. Right eye exhibits no discharge. Left eye exhibits no discharge. No scleral icterus. Neck: Normal range of motion. Neck supple. No JVD present. No tracheal deviation present. Cardiovascular: Normal rate, regular rhythm, normal heart sounds and intact distal pulses. Exam reveals no gallop and no friction rub. No murmur heard. Pulmonary/Chest: Effort normal. No stridor. No respiratory distress. He has decreased breath sounds (bilateral bases).  He has wheezes (expiratory). He has no rhonchi. He has no rales. He exhibits no tenderness. There is prolonged exhalation   Abdominal: Soft. Bowel sounds are normal. He exhibits no distension. There is no tenderness. There is no rebound and no guarding. Musculoskeletal: Normal range of motion. He exhibits no edema or tenderness. Right lower leg: He exhibits no edema. Left lower leg: He exhibits no edema. Neurological: He is alert and oriented to person, place, and time. He has normal strength and normal reflexes. No cranial nerve deficit or sensory deficit. He exhibits normal muscle tone. Coordination normal. GCS eye subscore is 4. GCS verbal subscore is 5. GCS motor subscore is 6. Skin: Skin is warm and dry. No rash noted. He is not diaphoretic. No erythema. No pallor. Psychiatric: He has a normal mood and affect. His behavior is normal. Judgment and thought content normal.   Nursing note and vitals reviewed. Note written by Vitaly Pelletier. Chantelle Chao, as dictated by Maria Del Rosario Lagos DO 2:31 AM      MDM  Number of Diagnoses or Management Options  Hypoxia:   SOB (shortness of breath):      Amount and/or Complexity of Data Reviewed  Clinical lab tests: ordered and reviewed  Tests in the radiology section of CPT®: ordered and reviewed  Tests in the medicine section of CPT®: ordered and reviewed  Discuss the patient with other providers: yes (Hospitalist)  Independent visualization of images, tracings, or specimens: yes (ekg)    Risk of Complications, Morbidity, and/or Mortality  Presenting problems: high  Diagnostic procedures: moderate  Management options: moderate    Critical Care  Total time providing critical care: 30-74 minutes (Total critical care time spent exclusive of procedures:  35 minutes)    Patient Progress  Patient progress: stable    ED Course       Procedures      CONSULT NOTE:  3:34 AM Maria Del Rosario Lagos DO spoke with Dr. Conrado Irby, Consult for Hospitalist.  Discussed available diagnostic tests and clinical findings. He is in agreement with care plans as outlined. Dr. Radha Engel will evaluate the patient for admission to the hospital.     Chief Complaint   Patient presents with    Shortness of Breath       The patients presenting problems have been discussed, and they are in agreement with the care plan formulated and outlined with them. I have encouraged them to ask questions as they arise throughout their visit. MEDICATIONS GIVEN:  Medications   methylPREDNISolone (PF) (SOLU-MEDROL) injection 40 mg (not administered)   albuterol 5mg / ipratropium 0.5mg neb solution (1 Dose Nebulization Given 12/6/17 0237)   methylPREDNISolone (PF) (SOLU-MEDROL) injection 125 mg (125 mg IntraVENous Given 12/6/17 0240)   albuterol 5mg / ipratropium 0.5mg neb solution (1 Dose Nebulization Given 12/6/17 0310)       LABS REVIEWED:  Recent Results (from the past 24 hour(s))   EKG, 12 LEAD, INITIAL    Collection Time: 12/06/17  2:30 AM   Result Value Ref Range    Ventricular Rate 68 BPM    Atrial Rate 68 BPM    P-R Interval 146 ms    QRS Duration 70 ms    Q-T Interval 372 ms    QTC Calculation (Bezet) 395 ms    Calculated P Axis 47 degrees    Calculated R Axis 62 degrees    Calculated T Axis 62 degrees    Diagnosis       Normal sinus rhythm  Anteroseptal infarct (cited on or before 10-SEP-2012)  Abnormal ECG  When compared with ECG of 10-SEP-2012 09:00,  No significant change was found     CBC WITH AUTOMATED DIFF    Collection Time: 12/06/17  2:38 AM   Result Value Ref Range    WBC 6.8 4.1 - 11.1 K/uL    RBC 4.96 4.10 - 5.70 M/uL    HGB 16.2 12.1 - 17.0 g/dL    HCT 44.4 36.6 - 50.3 %    MCV 89.5 80.0 - 99.0 FL    MCH 32.7 26.0 - 34.0 PG    MCHC 36.5 30.0 - 36.5 g/dL    RDW 12.6 11.5 - 14.5 %    PLATELET 083 662 - 049 K/uL    NEUTROPHILS 54 32 - 75 %    LYMPHOCYTES 24 12 - 49 %    MONOCYTES 7 5 - 13 %    EOSINOPHILS 14 (H) 0 - 7 %    BASOPHILS 1 0 - 1 %    ABS. NEUTROPHILS 3.7 1.8 - 8.0 K/UL    ABS. LYMPHOCYTES 1.6 0.8 - 3.5 K/UL    ABS. MONOCYTES 0.5 0.0 - 1.0 K/UL    ABS. EOSINOPHILS 0.9 (H) 0.0 - 0.4 K/UL    ABS. BASOPHILS 0.1 0.0 - 0.1 K/UL   METABOLIC PANEL, COMPREHENSIVE    Collection Time: 12/06/17  2:38 AM   Result Value Ref Range    Sodium 136 136 - 145 mmol/L    Potassium 4.4 3.5 - 5.1 mmol/L    Chloride 101 97 - 108 mmol/L    CO2 27 21 - 32 mmol/L    Anion gap 8 5 - 15 mmol/L    Glucose 100 65 - 100 mg/dL    BUN 14 6 - 20 MG/DL    Creatinine 0.89 0.70 - 1.30 MG/DL    BUN/Creatinine ratio 16 12 - 20      GFR est AA >60 >60 ml/min/1.73m2    GFR est non-AA >60 >60 ml/min/1.73m2    Calcium 9.5 8.5 - 10.1 MG/DL    Bilirubin, total 0.5 0.2 - 1.0 MG/DL    ALT (SGPT) 35 12 - 78 U/L    AST (SGOT) 25 15 - 37 U/L    Alk. phosphatase 96 45 - 117 U/L    Protein, total 7.5 6.4 - 8.2 g/dL    Albumin 4.0 3.5 - 5.0 g/dL    Globulin 3.5 2.0 - 4.0 g/dL    A-G Ratio 1.1 1.1 - 2.2     MAGNESIUM    Collection Time: 12/06/17  2:38 AM   Result Value Ref Range    Magnesium 2.1 1.6 - 2.4 mg/dL   INFLUENZA A & B AG (RAPID TEST)    Collection Time: 12/06/17  2:38 AM   Result Value Ref Range    Influenza A Antigen NEGATIVE  NEG      Influenza B Antigen NEGATIVE  NEG         VITAL SIGNS:  Patient Vitals for the past 12 hrs:   Temp Pulse Resp BP SpO2   12/06/17 0503 - - - (!) 170/107 -   12/06/17 0502 97.3 °F (36.3 °C) 84 17 (!) 146/108 92 %   12/06/17 0430 - 80 16 (!) 165/108 90 %   12/06/17 0400 - 79 13 (!) 156/111 90 %   12/06/17 0345 - 76 15 (!) 166/109 (!) 88 %   12/06/17 0300 - 65 12 (!) 148/112 90 %   12/06/17 0231 - - - - 91 %   12/06/17 0220 97.4 °F (36.3 °C) 69 18 (!) 183/113 (!) 89 %       RADIOLOGY RESULTS:  The following have been ordered and reviewed:  XR CHEST PORT   Final Result        Study Result      EXAM:  XR CHEST PORT     INDICATION:  Shortness of breath, cough, and congestion x4 days.     COMPARISON:  None.     FINDINGS: A portable AP radiograph of the chest was obtained at 02:43 hours.  The  patient is on a cardiac monitor. The lungs are clear. The cardiac and  mediastinal contours and pulmonary vascularity are normal.  The bones and soft  tissues are grossly within normal limits.      IMPRESSION  IMPRESSION: No acute findings. ED EKG interpretation:  Rhythm: normal sinus rhythm; and regular . Rate (approx.): 68; Axis: normal; P wave: normal; QRS interval: normal ; ST/T wave: normal; Other findings: abnormal ekg, anteroseptal infarct. This EKG was interpreted by Brittany Rizo DO,ED Provider. CONSULTATIONS:   Hospitalist    PROGRESS NOTES:  Suspect pt has COPD/emphysema. Pt still requiring O2. Discussed results and plan with patient. Patient will be admitted/observed for further evaluation and treatment. DIAGNOSIS:    1. SOB (shortness of breath)    2. Hypoxia        PLAN:  Admit/obs    ED COURSE: The patients hospital course has been uncomplicated.

## 2017-12-06 NOTE — PROGRESS NOTES
[facility]  [address] [address]  [phone]      Medical Progress Note      NAME: Yanira Fiore   :  1955  MRM:  [id number]    Date/Time: 2017  11:25 AM       Assessment and Plan:     Dyspnea (2017): Hx tobacco use with cough, wheezing, neg flu and an unremarkable CXR. Likely has COPD with acute exacerbation. Already starting to improve but remains on oxygen. Continue IV solumedrol. Duonebs. Will ask pulm to see patient as he will need outpt f/u with PFTs. Will likely need ICS/LABA but defer until after pulm evaluation.     Hypoxia POA: due to above. Not on any home oxygen. Supplemental oxygen and wean as tolerated     HTN (hypertension), benign (2017): not taking any medications. BP likely to go up with steroids. Monitor for now    Tobacco use. Counseled on cessation. Stopped smoking cigarettes and now smokes cigars. Counseled against this as well. Subjective:     Chief Complaint:  Patient seen and examined. Chart reviewed. Patient reports improvement in SOB and cough. ROS:  (bold if positive, if negative)    SOB/  Tolerating PT  Tolerating Diet        Objective:     Last 24hrs VS reviewed since prior progress note.  Most recent are:    Visit Vitals    BP (!) 139/92 (BP 1 Location: Right arm, BP Patient Position: At rest)    Pulse 71    Temp 98 °F (36.7 °C)    Resp 19    Ht 6' 1\" (1.854 m)    Wt 64.2 kg (141 lb 8.6 oz)    SpO2 94%    BMI 18.67 kg/m2     SpO2 Readings from Last 6 Encounters:   17 94%   12 99%   09/10/12 99%    O2 Flow Rate (L/min): 4 l/min     Intake/Output Summary (Last 24 hours) at 17 1125  Last data filed at 17 0531   Gross per 24 hour   Intake                0 ml   Output              625 ml   Net             -625 ml        Physical Exam:    Gen:  Well-developed, well-nourished, in no acute distress  HEENT:  Pink conjunctivae, PERRL, hearing intact to voice, moist mucous membranes  Neck:  Supple, without masses, thyroid non-tender  Resp:  No accessory muscle use, good air movement, scant wheezing  Card:  No murmurs, normal S1, S2 without thrills, bruits or peripheral edema  Abd:  Soft, non-tender, non-distended, normoactive bowel sounds are present, no palpable organomegaly and no detectable hernias  Lymph:  No cervical or inguinal adenopathy  Musc:  No cyanosis or clubbing  Skin:  No rashes or ulcers, skin turgor is good  Neuro:  Cranial nerves are grossly intact, no focal motor weakness, follows commands appropriately  Psych:  Good insight, oriented to person, place and time, alert    __________________________________________________________________  Medications Reviewed: (see below)  Medications:     Current Facility-Administered Medications   Medication Dose Route Frequency    sodium chloride (NS) flush 5-10 mL  5-10 mL IntraVENous Q8H    sodium chloride (NS) flush 5-10 mL  5-10 mL IntraVENous PRN    acetaminophen (TYLENOL) tablet 650 mg  650 mg Oral Q4H PRN    morphine injection 1 mg  1 mg IntraVENous Q4H PRN    naloxone (NARCAN) injection 0.4 mg  0.4 mg IntraVENous PRN    enoxaparin (LOVENOX) injection 40 mg  40 mg SubCUTAneous Q24H    methylPREDNISolone (PF) (SOLU-MEDROL) injection 40 mg  40 mg IntraVENous Q8H    albuterol-ipratropium (DUO-NEB) 2.5 MG-0.5 MG/3 ML  3 mL Nebulization Q6H RT    promethazine (PHENERGAN) 12.5 mg in NS 50 mL IVPB  12.5 mg IntraVENous Q8H PRN    hydrALAZINE (APRESOLINE) 20 mg/mL injection 10 mg  10 mg IntraVENous Q6H PRN        Lab Data Reviewed: (see below)  Lab Review:     Recent Labs      12/06/17   0238   WBC  6.8   HGB  16.2   HCT  44.4   PLT  244     Recent Labs      12/06/17   0238   NA  136   K  4.4   CL  101   CO2  27   GLU  100   BUN  14   CREA  0.89   CA  9.5   MG  2.1   ALB  4.0   TBILI  0.5   SGOT  25   ALT  35     No results found for: GLUCPOC  No results for input(s): PH, PCO2, PO2, HCO3, FIO2 in the last 72 hours.   No results for input(s): INR in the last 72 hours. No lab exists for component: INREXT  All Micro Results     Procedure Component Value Units Date/Time    INFLUENZA A & B AG (RAPID TEST) [[id number]] Collected:  12/06/17 0238    Order Status:  Completed Specimen:  Nasopharyngeal from Nasal washing Updated:  12/06/17 0311     Influenza A Antigen NEGATIVE         Influenza B Antigen NEGATIVE              I have reviewed notes of prior 24hr.     Other pertinent lab: None    Total time spent with patient: 35 mins                  Care Plan discussed with: Patient, Family and Nursing Staff    Discussed:  Care Plan and D/C Planning    Prophylaxis:  Lovenox    Disposition:  Home w/Family           ___________________________________________________    Attending Physician: Julius Cranker, DO

## 2017-12-06 NOTE — H&P
[facility]  [address] [facility], [facility]  [phone]    Admission History and Physical      NAME:              Radha Amaro   :   1955   MRN:  [id number]     PCP:  Not On File [facility]     Date:     2017     Chief  Complaint: SOB    History Of Presenting Illness:       Mr. Mari Pena is a 60 y.o. male who is being admitted for dyspnea. Mr. Mari Pena presented to our Emergency Department today complaining of SOB. This has been ongoing for the past four days or so and has been associated with a minimally productive cough and wheezing. He denies any chest pain, dizziness or palpitations. He did not take anything at home to modify his symptoms but in the ED, he was found to be hypoxic, wheezing and felt better with Duoneb. He has a remote Hx of tobacco use and currently smokes cigars. A chest Xray done, an EKG and labs were unremarkable. He will be admitted for further management. No Known Allergies    Prior to Admission medications    Medication Sig Start Date End Date Taking? Authorizing Provider   oxyCODONE-acetaminophen (PERCOCET) 5-325 mg per tablet Take 1-2 Tabs by mouth every four (4) hours as needed for Pain. 12   Leola Voss MD   promethazine (PHENERGAN) 25 mg tablet Take 1 Tab by mouth every six (6) hours as needed for Nausea. 12   Leola Voss MD   multivitamin (ONE A DAY) tablet Take 1 Tab by mouth daily. Historical Provider   TURMERIC ROOT EXTRACT PO Take  by mouth daily (after breakfast). Historical Provider       No past medical history on file.      Past Surgical History:   Procedure Laterality Date    HX HERNIA REPAIR      HX TONSILLECTOMY         Social History   Substance Use Topics    Smoking status: Current Some Day Smoker     Types: Cigars    Smokeless tobacco: Never Used    Alcohol use No Family history: brother had throat cancer      Review of Systems:    Constitutional ROS: no fever, chills, rigors or night sweats  Respiratory ROS: + cough, + sputum, - hemoptysis and - pleuritic pain. Cardiovascular ROS: no chest pain, palpitations, orthopnea, PND or syncope  Endocrine ROS: no polydispsia, polyuria, heat or cold intolerance or major weight change. Gastrointestinal ROS: no dysphagia, abdominal pain, nausea, vomiting or diarrhea    Genito-Urinary ROS: no dysuria, frequency, hematuria, retention or flank pain  Musculoskeletal ROS: no joint pain, swelling or muscular tenderness  Neurological ROS: no headache, confusion, focal weakness or any other neurological symptoms  Psychiatric ROS: no depression, anxiety, mood swings  Dermatological ROS: no rash, pruritis, or urticaria  Heme-Lymph ROS: no swollen glands, bleeding    Examination:    Constitutional:    Visit Vitals    BP (!) 148/112    Pulse 65    Temp 97.4 °F (36.3 °C)    Resp 12    Ht 6' 1\" (1.854 m)    Wt 64.2 kg (141 lb 8.6 oz)    SpO2 90%    BMI 18.67 kg/m2       General:  Weak and ill looking patient in no acute distress  Eyes: Pink conjunctivae, PERRLA with no discharge. Normal eye movements  Ear, Nose, Mouth & Throat: No ottorrhea, rhinorrhea, non tender sinuses, dry mucous membranes  Respiratory:  No accessory muscle use, decreased breath sounds without crackles. ++ wheezes  Cardiovascular:  No JVD or murmurs, regular and normal S1, S2 without thrills, bruits or peripheral edema. GI & :  Soft abdomen, non-tender, non-distended, normoactive bowel sounds with no palpable organomegaly  Heme:  No cervical or axillary adenopathy.    Musculoskeletal:  No cyanosis, clubbing, atrophy or deformities  Skin:  No rashes, bruising or ulcers   Neurological: Awake and alert, speech is clear, CNs 2-12 are grossly intact and otherwise non focal  Psychiatric:  Has a good insight and is oriented x 3  ________________________________________________________________________    Data Review:    Labs:    Recent Labs      12/06/17   0238   WBC  6.8   HGB  16.2   HCT  44.4   PLT  244     Recent Labs      12/06/17   0238   NA  136   K  4.4   CL  101   CO2  27   GLU  100   BUN  14   CREA  0.89   CA  9.5   MG  2.1   ALB  4.0   SGOT  25   ALT  35     No components found for: GLPOC  No results for input(s): PH, PCO2, PO2, HCO3, FIO2 in the last 72 hours. No results for input(s): INR in the last 72 hours. No lab exists for component: INREXT    Radiological Studies:      Personally reviewed Chest X-ray: Normal.    Other Medical tests:    Personally reviewed 12 lead EKG: Normal rate, rhythm, axis and intervals and no acute changes suggestive of ischemia    I have also reviewed available old medical records. Assessment & Impression:     Mr. Diego Dye is a 60 y.o. male being evaluated for:     Active Problems:    Dyspnea (12/6/2017)      HTN (hypertension), benign (12/6/2017)         Plan of management:    Dyspnea (12/6/2017): Hx tobacco use with cough, wheezing, neg flu and an unremarkable CXR. Likely has COPD with acute exacerbation. Admit to hospital. Start IV solumedrol. Duonebs. No antibiotic for now. Will need outpatient inhalers. Hypoxia POA: due to above. Not on any home oxygen. Supplemental oxygen and wean as tolerated    HTN (hypertension), benign (12/6/2017): not taking any medications. Family and patient say his BP is usually 'normal'.  Monitor for now     Code Status:  Full    Surrogate decision maker: Family    Risk of deterioration: high      Total time spent for the care of the patient: [address] discussed with: Patient, Family, Nursing Staff and ED physician    Discussed:  Code Status, Care Plan and D/C Planning    Prophylaxis:  Lovenox    Probable Disposition:  Home w/Family           ___________________________________________________    Attending Physician: Viola Santana Radha Valenzuela MD

## 2017-12-07 VITALS
RESPIRATION RATE: 19 BRPM | WEIGHT: 139.11 LBS | HEART RATE: 83 BPM | SYSTOLIC BLOOD PRESSURE: 158 MMHG | OXYGEN SATURATION: 93 % | DIASTOLIC BLOOD PRESSURE: 81 MMHG | HEIGHT: 73 IN | BODY MASS INDEX: 18.44 KG/M2 | TEMPERATURE: 98.4 F

## 2017-12-07 PROCEDURE — 74011000250 HC RX REV CODE- 250: Performed by: INTERNAL MEDICINE

## 2017-12-07 PROCEDURE — 94640 AIRWAY INHALATION TREATMENT: CPT

## 2017-12-07 PROCEDURE — 74011636637 HC RX REV CODE- 636/637: Performed by: INTERNAL MEDICINE

## 2017-12-07 PROCEDURE — 74011250636 HC RX REV CODE- 250/636: Performed by: INTERNAL MEDICINE

## 2017-12-07 PROCEDURE — 77010033678 HC OXYGEN DAILY

## 2017-12-07 PROCEDURE — 74011250637 HC RX REV CODE- 250/637: Performed by: INTERNAL MEDICINE

## 2017-12-07 PROCEDURE — 74011250637 HC RX REV CODE- 250/637: Performed by: NURSE PRACTITIONER

## 2017-12-07 RX ORDER — AZITHROMYCIN 250 MG/1
250 TABLET, FILM COATED ORAL DAILY
Status: DISCONTINUED | OUTPATIENT
Start: 2017-12-07 | End: 2017-12-07 | Stop reason: HOSPADM

## 2017-12-07 RX ORDER — GUAIFENESIN 600 MG/1
600 TABLET, EXTENDED RELEASE ORAL EVERY 12 HOURS
Qty: 20 TAB | Refills: 0 | Status: SHIPPED | OUTPATIENT
Start: 2017-12-07 | End: 2017-12-17

## 2017-12-07 RX ORDER — PREDNISONE 20 MG/1
20 TABLET ORAL 2 TIMES DAILY WITH MEALS
Qty: 6 TAB | Refills: 0 | Status: SHIPPED | OUTPATIENT
Start: 2017-12-07 | End: 2017-12-10

## 2017-12-07 RX ORDER — AZITHROMYCIN 250 MG/1
250 TABLET, FILM COATED ORAL DAILY
Qty: 4 TAB | Refills: 0 | Status: SHIPPED | OUTPATIENT
Start: 2017-12-07 | End: 2017-12-11

## 2017-12-07 RX ORDER — PREDNISONE 20 MG/1
20 TABLET ORAL 2 TIMES DAILY WITH MEALS
Status: DISCONTINUED | OUTPATIENT
Start: 2017-12-07 | End: 2017-12-07 | Stop reason: HOSPADM

## 2017-12-07 RX ADMIN — AZITHROMYCIN 250 MG: 250 TABLET, FILM COATED ORAL at 09:29

## 2017-12-07 RX ADMIN — GUAIFENESIN 600 MG: 600 TABLET, EXTENDED RELEASE ORAL at 09:29

## 2017-12-07 RX ADMIN — IPRATROPIUM BROMIDE AND ALBUTEROL SULFATE 3 ML: .5; 3 SOLUTION RESPIRATORY (INHALATION) at 01:13

## 2017-12-07 RX ADMIN — IPRATROPIUM BROMIDE AND ALBUTEROL SULFATE 3 ML: .5; 3 SOLUTION RESPIRATORY (INHALATION) at 14:01

## 2017-12-07 RX ADMIN — METHYLPREDNISOLONE SODIUM SUCCINATE 40 MG: 40 INJECTION, POWDER, FOR SOLUTION INTRAMUSCULAR; INTRAVENOUS at 05:27

## 2017-12-07 RX ADMIN — PREDNISONE 20 MG: 20 TABLET ORAL at 09:29

## 2017-12-07 RX ADMIN — Medication 10 ML: at 06:00

## 2017-12-07 RX ADMIN — ENOXAPARIN SODIUM 40 MG: 40 INJECTION SUBCUTANEOUS at 09:28

## 2017-12-07 RX ADMIN — IPRATROPIUM BROMIDE AND ALBUTEROL SULFATE 3 ML: .5; 3 SOLUTION RESPIRATORY (INHALATION) at 08:57

## 2017-12-07 NOTE — PROGRESS NOTES
Visit documented 12/6/17  Spiritual Care Partner Volunteer visited patient in Med Surg on 12/7/17.   Documented by:  Gabby Constantino [id number] ([id number])

## 2017-12-07 NOTE — CDMP QUERY
2) =>Confirmation of acute hypoxic respiratory failure POA 2/2 COPD exacerbation and acute bronchitis AEB SOB and hypoxia on RA and supplemental oxygen , or state if ruled out   =>Other Explanation of clinical findings  =>Unable to Determine (no explanation of clinical findings)    The medical record reflects the following clinical findings, treatment, and risk factors:    Risk Factors: 59 yo M presents with SOB And hypoxia   Clinical Indicators: ED states \" SOB, cough and congestion x 4 days, requiring O2\"  RR was WNL 18 with O2 sats 88% -90% while on supplemental oxygen   Treatment: O2 @ 2L via NC      Please clarify and document your clinical opinion in the progress notes and discharge summary including the definitive and/or presumptive diagnosis, (suspected or probable), related to the above clinical findings. Please include clinical findings supporting your diagnosis.     Thank you for your time   [facility] RN/BSN, [address] [place] [address]  Desk:   [phone]   Other:  [phone]

## 2017-12-07 NOTE — PROGRESS NOTES
[facility] PROGRESS NOTE  Pulmonary, Critical Care, and Sleep Medicine    Name: Yvonne Seay MRN: [id number]   : 1955 Hospital: [address] [facility]   Date: 2017  Admission Date: 2017     Chart and notes reviewed. Data reviewed. I review the patient's current medications in the medical record at each encounter.  I have evaluated and examined the patient. .     Overnight events reviewed:  Afebrile  BP stable overnight  O2 sats 93% on RA  Did not require oxygen during 6 minute walk    ROS: Feeling much better today. Breathing improved. Denies wheezing or chest tightness. Coughing a little, with scant amount of sputum production. Denies fever or chills. 12 point ROS reviewed and negative other than noted above. Vital Signs:  Visit Vitals    /81    Pulse 74    Temp 98.4 °F (36.9 °C)    Resp 19    Ht 6' 1\" (1.854 m)    Wt 63.1 kg (139 lb 1.8 oz)    SpO2 93%    BMI 18.35 kg/m2       O2 Device: Room air   O2 Flow Rate (L/min): 2 l/min   Temp (24hrs), Av.3 °F (36.8 °C), Min:97.8 °F (36.6 °C), Max:98.9 °F (37.2 °C)       Intake/Output:   Last shift:      701 - 1900  In: 240 [P.O.:240]  Out: -   Last 3 shifts: 1901 - 700  In: -   Out: 2175 [Urine:2175]    Intake/Output Summary (Last 24 hours) at 17 1403  Last data filed at 17 0932   Gross per 24 hour   Intake              240 ml   Output             1000 ml   Net             -760 ml        Telemetry:     Physical Exam:   General:  Alert, cooperative, no distress, appears stated age. Head:  Normocephalic, without obvious abnormality, atraumatic. Eyes:  Conjunctivae/corneas clear. Nose: Nares normal. Septum midline. Mucosa normal.    Throat: Lips, mucosa, and tongue normal. Teeth and gums normal.   Neck: Supple, symmetrical, trachea midline, no adenopathy   Lungs:   Clear to auscultation bilaterally. Chest wall:  No tenderness or deformity.    Heart:  Regular rate and rhythm, S1, S2 normal, no murmur, click, rub or gallop. Abdomen:   Soft, non-tender. Bowel sounds normal.    Extremities: Extremities normal, atraumatic, no cyanosis or edema. Pulses: 2+ and symmetric all extremities. Skin: Skin color, texture, turgor normal. No rashes or lesions   Lymph nodes: Cervical nodes normal.   Neurologic: Grossly nonfocal     DATA:  MAR reviewed and pertinent medications noted or modified as needed    Labs:  Recent Labs      12/06/17   0238   WBC  6.8   HGB  16.2   HCT  44.4   PLT  244     Recent Labs      12/06/17   0238   NA  136   K  4.4   CL  101   CO2  27   GLU  100   BUN  14   CREA  0.89   CA  9.5   MG  2.1   ALB  4.0   TBILI  0.5   SGOT  25   ALT  35       Imaging:  Personally reviewed. No new imaging today. CXR 12/6:  Hyperexpanded lungs. No infiltrate. IMPRESSION  · Acute Respiratory Failure with Hypoxia  · Wheezing; with potential underlying Asthma/ COPD exacerbation  · Acute Bronchitis  · Elevated Eosinophils  · Former Smoker  · Elevated Blood Pressure     PLAN  · Supplemental O2 to keep sats >90%. · Albuterol as needed   · Empiric Azithromycin   · F/U viral panel  · Mucinex  · Would consider adding LABA/LAMA such as Anoro or Stiolto or Bevespi prior to discharge  · Continued smoking cessation discussed and encouraged  · Patient and wife expressed concerns about affording inhalers: will refer to our research branch to see if he qualifies for any studies    Mr. Charles is stable from a pulmonary perspective. We will sign off and arrange for outpatient follow-up in 2-3 weeks. Thank you for allowing us to participate in Mr. Charles's care.         Lorne Kaufman NP

## 2017-12-07 NOTE — PROGRESS NOTES
Bedside shift change report given to [address] [place] [address] (oncoming nurse) by Julien Rodriguez RN (offgoing nurse). Report included the following information SBAR, Kardex, Intake/Output, MAR and Recent Results.

## 2017-12-07 NOTE — DISCHARGE INSTRUCTIONS
Patient Discharge Instructions    Ingris Ramirez / [id number] : 1955    Admitted 2017 Discharged: 2017     Primary Diagnoses  Problem List as of 2017  Date Reviewed: 2017          Codes Class Noted - Resolved   Dyspnea   HTN (hypertension), benign   Hypoxia  COPD          Take Home Medications       · It is important that you take the medication exactly as they are prescribed. · Keep your medication in the bottles provided by the pharmacist and keep a list of the medication names, dosages, and times to be taken in your wallet. · Do not take other medications without consulting your doctor. What to do at Home    Recommended diet: Regular Diet    Recommended activity: Activity as tolerated    If you experience worse breathing, please follow up with lung doctors. Follow-up with your PCP in a few weeks    Chronic Obstructive Pulmonary Disease (COPD): Care Instructions  Your Care Instructions    Chronic obstructive pulmonary disease (COPD) is a general term for a group of lung diseases, including emphysema and chronic bronchitis. People with COPD have decreased airflow in and out of the lungs, which makes it hard to breathe. The airways also can get clogged with thick mucus. Cigarette smoking is a major cause of COPD. Although there is no cure for COPD, you can slow its progress. Following your treatment plan and taking care of yourself can help you feel better and live longer. Follow-up care is a key part of your treatment and safety. Be sure to make and go to all appointments, and call your doctor if you are having problems. It's also a good idea to know your test results and keep a list of the medicines you take. How can you care for yourself at home? ?Staying healthy  ? · Do not smoke. This is the most important step you can take to prevent more damage to your lungs. If you need help quitting, talk to your doctor about stop-smoking programs and medicines.  These can increase your chances of quitting for good. ? · Avoid colds and flu. Get a pneumococcal vaccine shot. If you have had one before, ask your doctor whether you need a second dose. Get the flu vaccine every fall. If you must be around people with colds or the flu, wash your hands often. ? · Avoid secondhand smoke, air pollution, and high altitudes. Also avoid cold, dry air and hot, humid air. Stay at home with your windows closed when air pollution is bad. ?Medicines and oxygen therapy  ? · Take your medicines exactly as prescribed. Call your doctor if you think you are having a problem with your medicine. ? · You may be taking medicines such as:  ¨ Bronchodilators. These help open your airways and make breathing easier. Bronchodilators are either short-acting (work for 6 to 9 hours) or long-acting (work for 24 hours). You inhale most bronchodilators, so they start to act quickly. Always carry your quick-relief inhaler with you in case you need it while you are away from home. ¨ Corticosteroids (prednisone, budesonide). These reduce airway inflammation. They come in pill or inhaled form. You must take these medicines every day for them to work well. ? · A spacer may help you get more inhaled medicine to your lungs. Ask your doctor or pharmacist if a spacer is right for you. If it is, ask how to use it properly. ? · Do not take any vitamins, over-the-counter medicine, or herbal products without talking to your doctor first.   ? · If your doctor prescribed antibiotics, take them as directed. Do not stop taking them just because you feel better. You need to take the full course of antibiotics. ? · Oxygen therapy boosts the amount of oxygen in your blood and helps you breathe easier. Use the flow rate your doctor has recommended, and do not change it without talking to your doctor first.   Activity  ? · Get regular exercise. Walking is an easy way to get exercise.  Start out slowly, and walk a little more each day. ? · Pay attention to your breathing. You are exercising too hard if you cannot talk while you are exercising. ? · Take short rest breaks when doing household chores and other activities. ? · Learn breathing methods-such as breathing through pursed lips-to help you become less short of breath. ? · If your doctor has not set you up with a pulmonary rehabilitation program, talk to him or her about whether rehab is right for you. Rehab includes exercise programs, education about your disease and how to manage it, help with diet and other changes, and emotional support. Diet  ? · Eat regular, healthy meals. Use bronchodilators about 1 hour before you eat to make it easier to eat. Eat several small meals instead of three large ones. Drink beverages at the end of the meal. Avoid foods that are hard to chew. ? · Eat foods that contain protein so that you do not lose muscle mass. ? · Talk with your doctor if you gain too much weight or if you lose weight without trying. ?Mental health  ? · Talk to your family, friends, or a therapist about your feelings. It is normal to feel frightened, angry, hopeless, helpless, and even guilty. Talking openly about bad feelings can help you cope. If these feelings last, talk to your doctor. When should you call for help? Call 911 anytime you think you may need emergency care. For example, call if:  ? · You have severe trouble breathing. ?Call your doctor now or seek immediate medical care if:  ? · You have new or worse trouble breathing. ? · You cough up blood. ? · You have a fever. ? Watch closely for changes in your health, and be sure to contact your doctor if:  ? · You cough more deeply or more often, especially if you notice more mucus or a change in the color of your mucus. ? · You have new or worse swelling in your legs or belly. ? · You are not getting better as expected. Where can you learn more?   Go to http://heaven-brayan.info/. Bassam [place] in the search box to learn more about \"Chronic Obstructive Pulmonary Disease (COPD): Care Instructions. \"  Current as of: May 12, 2017  Content Version: 11.4  © 7000-7535 [employer]. Care instructions adapted under license by [employer] (which disclaims liability or warranty for this information). If you have questions about a medical condition or this instruction, always ask your healthcare professional. [place] [address] any warranty or liability for your use of this information. Information obtained by :  I understand that if any problems occur once I am at home I am to contact my physician. I understand and acknowledge receipt of the instructions indicated above.                                                                                                                                            Physician's or R.N.'s Signature                                                                  Date/Time                                                                                                                                              Patient or Representative Signature                                                          Date/Time

## 2017-12-07 NOTE — PROGRESS NOTES
Bedside and Verbal shift change report given to Kamari Churchill RN (oncoming nurse) by Carole Yang RN (offgoing nurse). Report included the following information SBAR, Kardex, Procedure Summary, Intake/Output and MAR. I have reviewed discharge instructions with the patient and spouse. The patient and spouse verbalized understanding. If you experience chest pain call 911. Do not drive yourself. IV reviewed, tolerated well.  Prescriptions given

## 2017-12-07 NOTE — DISCHARGE SUMMARY
Physician Discharge Summary     Patient ID:  Mykel Gomez  [id number]  54 y.o.  1955    Admit date: 12/6/2017    Discharge date and time: 12/7/2017    Admission Diagnoses: Dyspnea    Discharge Diagnoses:    Principal Diagnosis     COPD exacerbation                                             Other Diagnoses    Dyspnea (12/6/2017)    HTN (hypertension), benign (12/6/2017)    Hypoxia (12/6/2017)      Hospital Course:  COPD with exacerbation / Dyspnea - POA, presumed, and would be a new Dx. Hx tobacco use with cough, wheezing. Unremarkable CXR. Negative viral panel. Improved greatly after IV solumedrol, guaifenesin and Duonebs. Appreciate pulm input. He will need outpt f/u with PFTs. Will likely need ICS/LABA but defer to pulm evaluation. At least will be on prednisone for 3 days. Empiric azithromycin can be completed outpatient.      Acute hypoxic respiratory failure - POA 2/2 COPD exa cerbation and acute bronchitis as evidenced by SOB and hypoxia on RA and supplemental oxygen. Now resolved. Not on any home oxygen. Supplemental oxygen has been weaned, and now no hypoxia on 6 minute walk.      HTN (hypertension), benign - Not taking any medications. BP likely to go up with steroids. Monitor for now      Tobacco use - Counseled on cessation. Stopped smoking cigarettes and now smokes cigars. Underweight - BMI 18. Advised supplements. Likely due to chronic COPD.     PCP: PROVIDER UNKNOWN    Consults: Pulmonary/Intensive care    Significant Diagnostic Studies: See Hospital Course    Discharged home in improved condition.     Discharge Exam:   /84 (BP 1 Location: Left arm, BP Patient Position: At rest)    Pulse 76    Temp 98.6 °F (37 °C)    Resp 16    Ht 6' 1\" (1.854 m)    Wt 63.1 kg (139 lb 1.8 oz)    SpO2 95%    BMI 18.35 kg/m2      Gen:  Thin, in no acute distress  HEENT:  Pink conjunctivae, PERRL, hearing intact to voice, moist mucous membranes  Neck:  Supple, without masses, thyroid non-tender  Resp:  No accessory muscle use, clear breath sounds without wheezes rales or rhonchi  Card:  No murmurs, normal S1, S2 without thrills, bruits or peripheral edema  Abd:  Soft, non-tender, non-distended, normoactive bowel sounds are present, no mass  Lymph:  No cervical or inguinal adenopathy  Musc:  No cyanosis or clubbing  Skin:  No rashes or ulcers, skin turgor is good  Neuro:  Cranial nerves are grossly intact, no focal motor weakness, follows commands appropriately  Psych:  Good insight, oriented to person, place and time, alert    Patient Instructions:   Current Discharge Medication List      START taking these medications    Details   azithromycin (ZITHROMAX) 250 mg tablet Take 1 Tab by mouth daily for 4 days. Qty: 4 Tab, Refills: 0      guaiFENesin ER (MUCINEX) 600 mg ER tablet Take 1 Tab by mouth every twelve (12) hours for 10 days. Qty: 20 Tab, Refills: 0      predniSONE (DELTASONE) 20 mg tablet Take 1 Tab by mouth two (2) times daily (with meals) for 3 days. Qty: 6 Tab, Refills: 0      albuterol sulfate 90 mcg/actuation aepb Take 2 Puffs by inhalation four (4) times daily as needed. Qty: 1 Inhaler, Refills: 0         CONTINUE these medications which have NOT CHANGED    Details   B.infantis-B.ani-B.long-B.bifi (PROBIOTIC 4X) 10-15 mg TbEC Take 1 Cap by mouth nightly. OTHER Olive leaf one capsule by mouth daily, Biocleanse one capsule by mouth every evening      ascorbic acid, vitamin C, (VITAMIN C) 1,000 mg tablet Take 1,000 mg by mouth nightly. multivitamin (ONE A DAY) tablet Take 1 Tab by mouth daily. Activity: Activity as tolerated  Diet: Regular Diet  Wound Care: None needed    Follow-up with your PCP and lung doctor in a few weeks.   Follow-up tests/labs - none    Signed:  Sia Zaldivar MD  12/7/2017  10:52 AM

## 2017-12-07 NOTE — PROGRESS NOTES
Problem: Falls - Risk of  Goal: *Absence of Falls  Document Anahi Fall Risk and appropriate interventions in the flowsheet.    Outcome: Progressing Towards Goal  Fall Risk Interventions:            Medication Interventions: Patient to call before getting OOB, Teach patient to arise slowly

## 2017-12-07 NOTE — PROGRESS NOTES
[facility] [address]  [address] [facility], [place], [address] [place] [address]  [phone]      Medical Progress Note      NAME: Kristi Berumen   :  1955  MRM:  [id number]    Date/Time: 2017  10:46 AM       Assessment and Plan:     COPD with exacerbation / Dyspnea - POA, presumed, and would be a new Dx. Hx tobacco use with cough, wheezing. Unremarkable CXR. Negative viral panel. Improved greatly after IV solumedrol, guaifenesin and Duonebs. Appreciate pulm input. He will need outpt f/u with PFTs. Will likely need ICS/LABA but defer to pulm evaluation. At least will be on prednisone for 3 days. Empiric azithromycin can be completed outpatient.      Hypoxia - POA, due to above, now resolved. Not on any home oxygen. Supplemental oxygen has been weaned, no hypoxia on 6 minute walk.      HTN (hypertension), benign - Not taking any medications. BP likely to go up with steroids. Monitor for now     Tobacco use - Counseled on cessation. Stopped smoking cigarettes and now smokes cigars. Subjective:     Chief Complaint:  Feels much better. Wants to go home. ROS:  (bold if positive, if negative)      Tolerating PT  Tolerating Diet        Objective:     Last 24hrs VS reviewed since prior progress note.  Most recent are:    Visit Vitals    /84 (BP 1 Location: Left arm, BP Patient Position: At rest)    Pulse 76    Temp 98.6 °F (37 °C)    Resp 16    Ht 6' 1\" (1.854 m)    Wt 63.1 kg (139 lb 1.8 oz)    SpO2 95%    BMI 18.35 kg/m2     SpO2 Readings from Last 6 Encounters:   17 95%   12 99%   09/10/12 99%    O2 Flow Rate (L/min): 2 l/min     Intake/Output Summary (Last 24 hours) at 17 1046  Last data filed at 17 0932   Gross per 24 hour   Intake              240 ml   Output             1550 ml   Net            -1310 ml        Physical Exam:    Gen:  Thin, in no acute distress  HEENT:  Pink conjunctivae, PERRL, hearing intact to voice, moist mucous membranes  Neck:  Supple, without masses, thyroid non-tender  Resp:  No accessory muscle use, clear breath sounds without wheezes rales or rhonchi  Card:  No murmurs, normal S1, S2 without thrills, bruits or peripheral edema  Abd:  Soft, non-tender, non-distended, normoactive bowel sounds are present, no mass  Lymph:  No cervical or inguinal adenopathy  Musc:  No cyanosis or clubbing  Skin:  No rashes or ulcers, skin turgor is good  Neuro:  Cranial nerves are grossly intact, no focal motor weakness, follows commands appropriately  Psych:  Good insight, oriented to person, place and time, alert    Telemetry reviewed:   normal sinus rhythm  __________________________________________________________________  Medications Reviewed: (see below)  Medications:     Current Facility-Administered Medications   Medication Dose Route Frequency    predniSONE (DELTASONE) tablet 20 mg  20 mg Oral BID WITH MEALS    azithromycin (ZITHROMAX) tablet 250 mg  250 mg Oral DAILY    sodium chloride (NS) flush 5-10 mL  5-10 mL IntraVENous Q8H    sodium chloride (NS) flush 5-10 mL  5-10 mL IntraVENous PRN    acetaminophen (TYLENOL) tablet 650 mg  650 mg Oral Q4H PRN    morphine injection 1 mg  1 mg IntraVENous Q4H PRN    naloxone (NARCAN) injection 0.4 mg  0.4 mg IntraVENous PRN    enoxaparin (LOVENOX) injection 40 mg  40 mg SubCUTAneous Q24H    albuterol-ipratropium (DUO-NEB) 2.5 MG-0.5 MG/3 ML  3 mL Nebulization Q6H RT    promethazine (PHENERGAN) 12.5 mg in NS 50 mL IVPB  12.5 mg IntraVENous Q8H PRN    hydrALAZINE (APRESOLINE) 20 mg/mL injection 10 mg  10 mg IntraVENous Q6H PRN    guaiFENesin ER (MUCINEX) tablet 600 mg  600 mg Oral Q12H        Lab Data Reviewed: (see below)  Lab Review:     Recent Labs      12/06/17   0238   WBC  6.8   HGB  16.2   HCT  44.4   PLT  244     Recent Labs      12/06/17   0238   NA  136   K  4.4   CL  101   CO2  27   GLU  100   BUN  14   CREA  0.89   CA  9.5   MG  2.1   ALB  4.0   TBILI  0.5   SGOT 25   ALT  35     No results found for: GLUCPOC  No results for input(s): PH, PCO2, PO2, HCO3, FIO2 in the last 72 hours. No results for input(s): INR in the last 72 hours. No lab exists for component: INREXT  All Micro Results     Procedure Component Value Units Date/Time    RESPIRATORY PANEL,PCR,NASOPHARYNGEAL [[id number]] Collected:  12/06/17 1459    Order Status:  Completed Specimen:  Nasopharyngeal Updated:  12/06/17 1848     Adenovirus NOT DETECTED        Coronavirus 229E NOT DETECTED        Coronavirus HKU1 NOT DETECTED        Coronavirus CVNL63 NOT DETECTED        Coronavirus OC43 NOT DETECTED        Metapneumovirus NOT DETECTED        Rhinovirus and Enterovirus NOT DETECTED        Influenza A NOT DETECTED        Influenza A, subtype H1 NOT DETECTED        Influenza A, subtype H3 NOT DETECTED        INFLUENZA A H1N1 PCR NOT DETECTED        Influenza B NOT DETECTED        Parainfluenza 1 NOT DETECTED        Parainfluenza 2 NOT DETECTED        Parainfluenza 3 NOT DETECTED        Parainfluenza virus 4 NOT DETECTED        RSV by PCR NOT DETECTED        Bordetella pertussis - PCR NOT DETECTED        Chlamydophila pneumoniae DNA, QL, PCR NOT DETECTED        Mycoplasma pneumoniae DNA, QL, PCR NOT DETECTED       INFLUENZA A & B AG (RAPID TEST) [[id number]] Collected:  12/06/17 0238    Order Status:  Completed Specimen:  Nasopharyngeal from Nasal washing Updated:  12/06/17 0311     Influenza A Antigen NEGATIVE         Influenza B Antigen NEGATIVE              I have reviewed notes of prior 24hr.     Other pertinent lab: none    Total time spent with patient: [address] [place] [address] [place] discussed with: Patient, Family, Care Manager, Nursing Staff, Consultant/Specialist and >50% of time spent in counseling and coordination of care    Discussed:  Care Plan and D/C Planning    Prophylaxis:  H2B/PPI    Disposition:  Home w/Family           ___________________________________________________    Attending Physician: Kiko Sunshine MD

## 2017-12-11 ENCOUNTER — OFFICE VISIT (OUTPATIENT)
Dept: FAMILY MEDICINE CLINIC | Age: 62
End: 2017-12-11

## 2017-12-11 VITALS
OXYGEN SATURATION: 98 % | HEART RATE: 59 BPM | TEMPERATURE: 97.6 F | HEIGHT: 73 IN | WEIGHT: 139.5 LBS | SYSTOLIC BLOOD PRESSURE: 98 MMHG | BODY MASS INDEX: 18.49 KG/M2 | RESPIRATION RATE: 16 BRPM | DIASTOLIC BLOOD PRESSURE: 70 MMHG

## 2017-12-11 DIAGNOSIS — J40 BRONCHITIS: ICD-10-CM

## 2017-12-11 DIAGNOSIS — J44.1 CHRONIC OBSTRUCTIVE PULMONARY DISEASE WITH ACUTE EXACERBATION (HCC): Primary | ICD-10-CM

## 2017-12-11 NOTE — MR AVS SNAPSHOT
[address] [address] 
[phone] Patient: Tisha Kilpatrick MRN: [id number] FQY:[id number] Visit Information Date & Time Provider Department Dept. Phone Encounter #  
 12/11/2017 11:00 AM Tata Butts NP [address] [facility] [phone] [id number] Upcoming Health Maintenance Date Due Hepatitis C Screening 1955 Pneumococcal 19-64 Medium Risk (1 of 1 - PPSV23) 10/3/1973 DTaP/Tdap/Td series (1 - Tdap) 10/3/1975 FOBT Q 1 YEAR AGE 50-75 10/3/2004 ZOSTER VACCINE AGE 60> 8/3/2014 Influenza Age 5 to Adult 8/1/2017 Allergies as of 12/11/2017  Review Complete On: 12/11/2017 By: Merle Cavazos LPN No Known Allergies Current Immunizations  Never Reviewed No immunizations on file. Not reviewed this visit You Were Diagnosed With   
  
 Codes Comments Chronic obstructive pulmonary disease with acute exacerbation (HCC)    -  Primary ICD-10-CM: J44.1 ICD-9-CM: 491.21 Bronchitis     ICD-10-CM: J40 ICD-9-CM: 697 Vitals BP Pulse Temp Resp Height(growth percentile) Weight(growth percentile) 98/70 (!) 59 97.6 °F (36.4 °C) (Oral) 16 6' 1\" (1.854 m) 139 lb 8 oz (63.3 kg) SpO2 BMI Smoking Status 98% 18.4 kg/m2 Current Some Day Smoker Vitals History BMI and BSA Data Body Mass Index Body Surface Area  
 18.4 kg/m 2 1.81 m 2 Preferred Pharmacy Pharmacy Name Phone [facility] [address], [address] [phone] Your Updated Medication List  
  
   
This list is accurate as of: 12/11/17 11:30 AM.  Always use your most recent med list.  
  
  
  
  
 albuterol sulfate 90 mcg/actuation Aepb Take 2 Puffs by inhalation four (4) times daily as needed. azithromycin 250 mg tablet Commonly known as:  Leta Janayd Take 1 Tab by mouth daily for 4 days. guaiFENesin  mg ER tablet Commonly known as:  Charan & Charan Take 1 Tab by mouth every twelve (12) hours for 10 days. multivitamin tablet Commonly known as:  ONE A DAY Take 1 Tab by mouth daily. OTHER Olive leaf one capsule by mouth daily, Biocleanse one capsule by mouth every evening PROBIOTIC 4X 10-15 mg Tbec Generic drug:  B.infantis-B.ani-B.long-B.bifi Take 1 Cap by mouth nightly. VITAMIN C 1,000 mg tablet Generic drug:  ascorbic acid (vitamin C) Take 1,000 mg by mouth nightly. Introducing [facility]! [employer] introduces [employer] patient portal. Now you can access parts of your medical record, email your doctor's office, and request medication refills online. 1. In your internet browser, go to [email or web address] 2. Click on the First Time User? Click Here link in the Sign In box. You will see the New Member Sign Up page. 3. Enter your [employer] Access Code exactly as it appears below. You will not need to use this code after youve completed the sign-up process. If you do not sign up before the expiration date, you must request a new code. · [employer] Access Code: [email or web address] Expires: 3/7/2018 12:01 PM 
 
4. Enter the last four digits of your Social Security Number (xxxx) and Date of Birth (mm/dd/yyyy) as indicated and click Submit. You will be taken to the next sign-up page. 5. Create a [employer] ID. This will be your [employer] login ID and cannot be changed, so think of one that is secure and easy to remember. 6. Create a [employer] password. You can change your password at any time. 7. Enter your Password Reset Question and Answer. This can be used at a later time if you forget your password. 8. Enter your e-mail address. You will receive e-mail notification when new information is available in [address]. 9. Click Sign Up. You can now view and download portions of your medical record. 10. Click the Download Summary menu link to download a portable copy of your medical information. If you have questions, please visit the Frequently Asked Questions section of the [employer] website. Remember, [employer] is NOT to be used for urgent needs. For medical emergencies, dial 911. Now available from your iPhone and Android! Please provide this summary of care documentation to your next provider. Your primary care clinician is listed as PROVIDER UNKNOWN. If you have any questions after today's visit, please call [phone].

## 2017-12-11 NOTE — PROGRESS NOTES
1. Have you been to the ER, urgent care clinic since your last visit? Hospitalized since your last visit? Yes [facility] on 12/6/17 for COPD exacerbation    2. Have you seen or consulted any other health care providers outside of the [address] [place] [address] since your last visit? Include any pap smears or colon screening.  No    Chief Complaint   Patient presents with   [facility] Follow Up     [facility] ED on 12/6/17 for COPD exacerbation

## 2017-12-11 NOTE — PROGRESS NOTES
HISTORY OF PRESENT ILLNESS  Jatin Cline is a 60 y.o. male. HPI  New patient to the practice  No PCP in the past, no insurance  Has been previous smoker, quit 2 mo ago  Had chest congestion and cough that turned into sob so bad he had to go to ER  Admitted for 3 days, dx with copd  Started on pred, abx, and albuterol inhaler  He is feeling better, no sob/wheeze, no fever  The FamHx, SocHx, MedHx, Medication, and Allergy lists have been reviewed and updated in the chart. ROS  A comprehensive review of system was obtained and negative except findings in the HPI    Visit Vitals    BP 98/70    Pulse (!) 59    Temp 97.6 °F (36.4 °C) (Oral)    Resp 16    Ht 6' 1\" (1.854 m)    Wt 139 lb 8 oz (63.3 kg)    SpO2 98%    BMI 18.4 kg/m2     Physical Exam   Constitutional: He appears well-developed and well-nourished. No distress. Cardiovascular: Normal rate and regular rhythm. No murmur heard. Pulmonary/Chest: Breath sounds normal. No respiratory distress. He has no wheezes. He has no rales. Musculoskeletal: He exhibits no edema. Neurological: He is alert. Nursing note and vitals reviewed. ASSESSMENT and PLAN  Encounter Diagnoses   Name Primary?  Chronic obstructive pulmonary disease with acute exacerbation (HCC) Yes    Bronchitis      No orders of the defined types were placed in this encounter. No change in med regimen, finish all meds until gone  Encouraged to make appt for cpx in the near future for well exam  I have discussed the diagnosis with the patient and the intended plan as seen in the above orders. The patient has received an after-visit summary and questions were answered concerning future plans. Patient conveyed understanding of the plan at the time of the visit.     Luma Clark, MSN, ANP  12/11/2017

## 2018-04-12 ENCOUNTER — OFFICE VISIT (OUTPATIENT)
Dept: FAMILY MEDICINE CLINIC | Age: 63
End: 2018-04-12

## 2018-04-12 VITALS
OXYGEN SATURATION: 97 % | SYSTOLIC BLOOD PRESSURE: 137 MMHG | TEMPERATURE: 97.8 F | HEART RATE: 62 BPM | HEIGHT: 73 IN | WEIGHT: 150 LBS | BODY MASS INDEX: 19.88 KG/M2 | DIASTOLIC BLOOD PRESSURE: 94 MMHG | RESPIRATION RATE: 16 BRPM

## 2018-04-12 DIAGNOSIS — Z00.00 ROUTINE GENERAL MEDICAL EXAMINATION AT A HEALTH CARE FACILITY: Primary | ICD-10-CM

## 2018-04-12 DIAGNOSIS — I10 HTN (HYPERTENSION), BENIGN: ICD-10-CM

## 2018-04-12 DIAGNOSIS — Z12.11 SCREENING FOR COLON CANCER: ICD-10-CM

## 2018-04-12 DIAGNOSIS — Z11.59 SCREENING FOR VIRAL DISEASE: ICD-10-CM

## 2018-04-12 DIAGNOSIS — J45.20 MILD INTERMITTENT REACTIVE AIRWAY DISEASE WITHOUT COMPLICATION: ICD-10-CM

## 2018-04-12 RX ORDER — MONTELUKAST SODIUM 10 MG/1
10 TABLET ORAL DAILY
COMMUNITY
End: 2018-04-12 | Stop reason: SDUPTHER

## 2018-04-12 RX ORDER — MONTELUKAST SODIUM 10 MG/1
10 TABLET ORAL DAILY
Qty: 90 TAB | Refills: 3 | Status: SHIPPED | OUTPATIENT
Start: 2018-04-12 | End: 2019-09-16 | Stop reason: SDUPTHER

## 2018-04-12 NOTE — MR AVS SNAPSHOT
[address] [address] 
[phone] Patient: Светлана Wells MRN: [id number] CXZ:73/9/1717 Visit Information Date & Time Provider Department Dept. Phone Encounter #  
 4/12/2018 10:00 AM Katelin Maldonado, NP [facility] [facility] [phone] [id number] Upcoming Health Maintenance Date Due Hepatitis C Screening 1955 Pneumococcal 19-64 Medium Risk (1 of 1 - PPSV23) 10/3/1973 DTaP/Tdap/Td series (1 - Tdap) 10/3/1975 FOBT Q 1 YEAR AGE 50-75 10/3/2004 ZOSTER VACCINE AGE 60> 8/3/2014 Influenza Age 5 to Adult 8/1/2017 Allergies as of 4/12/2018  Review Complete On: 4/12/2018 By: Tu Cota LPN No Known Allergies Current Immunizations  Never Reviewed No immunizations on file. Not reviewed this visit You Were Diagnosed With   
  
 Codes Comments Routine general medical examination at a health care facility    -  Primary ICD-10-CM: Z00.00 ICD-9-CM: V70.0 Screening for colon cancer     ICD-10-CM: Z12.11 ICD-9-CM: V76.51   
 HTN (hypertension), benign     ICD-10-CM: I10 
ICD-9-CM: 401.1 Screening for viral disease     ICD-10-CM: Z11.59 
ICD-9-CM: V73.99 Vitals BP Pulse Temp Resp Height(growth percentile) Weight(growth percentile) (!) 137/94 62 97.8 °F (36.6 °C) (Oral) 16 6' 1\" (1.854 m) 150 lb (68 kg) SpO2 BMI Smoking Status 97% 19.79 kg/m2 Current Some Day Smoker Vitals History BMI and BSA Data Body Mass Index Body Surface Area 19.79 kg/m 2 1.87 m 2 Preferred Pharmacy Pharmacy Name Phone 453 [address], [address] [place] [address] [address] [phone] Your Updated Medication List  
  
   
This list is accurate as of 4/12/18 10:41 AM.  Always use your most recent med list.  
  
  
  
  
 albuterol sulfate 90 mcg/actuation Aepb Take 2 Puffs by inhalation four (4) times daily as needed. multivitamin tablet Commonly known as:  ONE A DAY Take 1 Tab by mouth daily. OTHER Olive leaf one capsule by mouth daily, Biocleanse one capsule by mouth every evening PROBIOTIC 4X 10-15 mg Tbec Generic drug:  B.infantis-B.ani-B.long-B.bifi Take 1 Cap by mouth nightly. SINGULAIR 10 mg tablet Generic drug:  montelukast  
Take 10 mg by mouth daily. VITAMIN C 1,000 mg tablet Generic drug:  ascorbic acid (vitamin C) Take 1,000 mg by mouth nightly. We Performed the Following CBC WITH AUTOMATED DIFF [47741 CPT(R)] HEPATITIS C AB [86338 CPT(R)] LIPID PANEL [25547 CPT(R)] METABOLIC PANEL, COMPREHENSIVE [52292 CPT(R)] PSA SCREENING (SCREENING) [ [facility]] To-Do List   
 05/12/2018 Lab:  OCCULT BLOOD, IMMUNOASSAY (FIT) Patient Instructions Well Visit, Men 48 to 72: Care Instructions Your Care Instructions Physical exams can help you stay healthy. Your doctor has checked your overall health and may have suggested ways to take good care of yourself. He or she also may have recommended tests. At home, you can help prevent illness with healthy eating, regular exercise, and other steps. Follow-up care is a key part of your treatment and safety. Be sure to make and go to all appointments, and call your doctor if you are having problems. It's also a good idea to know your test results and keep a list of the medicines you take. How can you care for yourself at home? · Reach and stay at a healthy weight. This will lower your risk for many problems, such as obesity, diabetes, heart disease, and high blood pressure. · Get at least 30 minutes of exercise on most days of the week. Walking is a good choice. You also may want to do other activities, such as running, swimming, cycling, or playing tennis or team sports. · Do not smoke. Smoking can make health problems worse. If you need help quitting, talk to your doctor about stop-smoking programs and medicines. These can increase your chances of quitting for good. · Protect your skin from too much sun. When you're outdoors from 10 a.m. to 4 p.m., stay in the shade or cover up with clothing and a hat with a wide brim. Wear sunglasses that block UV rays. Even when it's cloudy, put broad-spectrum sunscreen (SPF 30 or higher) on any exposed skin. · See a dentist one or two times a year for checkups and to have your teeth cleaned. · Wear a seat belt in the car. · Limit alcohol to 2 drinks a day. Too much alcohol can cause health problems. Follow your doctor's advice about when to have certain tests. These tests can spot problems early. · Cholesterol. Your doctor will tell you how often to have this done based on your overall health and other things that can increase your risk for heart attack and stroke. · Blood pressure. Have your blood pressure checked during a routine doctor visit. Your doctor will tell you how often to check your blood pressure based on your age, your blood pressure results, and other factors. · Prostate exam. Talk to your doctor about whether you should have a blood test (called a PSA test) for prostate cancer. Experts disagree on whether men should have this test. Some experts recommend that you discuss the benefits and risks of the test with your doctor. · Diabetes. Ask your doctor whether you should have tests for diabetes. · Vision. Some experts recommend that you have yearly exams for glaucoma and other age-related eye problems starting at age 48. · Hearing. Tell your doctor if you notice any change in your hearing. You can have tests to find out how well you hear. · Colon cancer. You should begin tests for colon cancer at age 48. You may have one of several tests.  Your doctor will tell you how often to have tests based on your age and risk. Risks include whether you already had a precancerous polyp removed from your colon or whether your parent, brother, sister, or child has had colon cancer. · Heart attack and stroke risk. At least every 4 to 6 years, you should have your risk for heart attack and stroke assessed. Your doctor uses factors such as your age, blood pressure, cholesterol, and whether you smoke or have diabetes to show what your risk for a heart attack or stroke is over the next 10 years. · Abdominal aortic aneurysm. Ask your doctor whether you should have a test to check for an aneurysm. You may need a test if you ever smoked or if your parent, brother, sister, or child has had an aneurysm. When should you call for help? Watch closely for changes in your health, and be sure to contact your doctor if you have any problems or symptoms that concern you. Where can you learn more? Go to http://heaven-brayan.info/. Enter O441 in the search box to learn more about \"Well Visit, Men 48 to 72: Care Instructions. \" Current as of: May 12, 2017 Content Version: 11.4 © 5791-6788 [employer]. Care instructions adapted under license by [employer] (which disclaims liability or warranty for this information). If you have questions about a medical condition or this instruction, always ask your healthcare professional. [address] [address] any warranty or liability for your use of this information. Introducing [facility]! Dear Marcelina Bolden: Thank you for requesting a [employer] account. Our records indicate that you already have an active [employer] account. You can access your account anytime at [email or web address] Did you know that you can access your hospital and ER discharge instructions at any time in Helios? You can also review all of your test results from your hospital stay or ER visit. Additional Information If you have questions, please visit the Frequently Asked Questions section of the [employer] website at [email or web address] Remember, [employer] is NOT to be used for urgent needs. For medical emergencies, dial 911. Now available from your iPhone and Android! Please provide this summary of care documentation to your next provider. Your primary care clinician is listed as PROVIDER UNKNOWN. If you have any questions after today's visit, please call [phone].

## 2018-04-12 NOTE — PROGRESS NOTES
Maynor Dodd is a 60 y.o. male presenting for their annual checkup. Follows a low fat diet?  no.  Dietary recall: 3 meals a day, lots of fruits and vegetables, drinks mostly coffee (8)   Follow an exercise program?  No but tries to stay active   Hours of sleep? 8-9 hrs   Changes in bowel or bladder habits? No, has not yet had colonoscopy   Up to date on Tdap (<10 years)? Yes, done elsewhere   Feels stable and well emotionally? Yes     Current concerns include:     History reviewed. No pertinent past medical history. Past Surgical History:   Procedure Laterality Date    HX HERNIA REPAIR      bilateral inguinal    HX TONSILLECTOMY        Prior to Admission medications    Medication Sig Start Date End Date Taking? Authorizing Provider   montelukast (SINGULAIR) 10 mg tablet Take 1 Tab by mouth daily. 4/12/18  Yes Veronique Meng NP   albuterol sulfate 90 mcg/actuation aepb Take 2 Puffs by inhalation four (4) times daily as needed. 4/12/18  Yes Veronique Meng NP   B.infantis-B.ani-B.long-B.bifi (PROBIOTIC 4X) 10-15 mg TbEC Take 1 Cap by mouth nightly. Yes Historical Provider   OTHER Olive leaf one capsule by mouth daily, Biocleanse one capsule by mouth every evening   Yes Historical Provider   ascorbic acid, vitamin C, (VITAMIN C) 1,000 mg tablet Take 1,000 mg by mouth nightly. Yes Historical Provider   multivitamin (ONE A DAY) tablet Take 1 Tab by mouth daily.      Yes Historical Provider     No Known Allergies   Social History   Substance Use Topics    Smoking status: Former Smoker     Types: Cigars     Quit date: 12/2017    Smokeless tobacco: Never Used    Alcohol use No      Family History   Problem Relation Age of Onset    No Known Problems Father       Review of Systems -   Psychological ROS: negative  Ophthalmic ROS: negative  ENT ROS: negative  Endocrine ROS: negative  Breast ROS: negative  Respiratory ROS: no cough, shortness of breath, or wheezing  Cardiovascular ROS: no chest pain or dyspnea on exertion  Gastrointestinal ROS: no abdominal pain, change in bowel habits, or black or bloody stools  Genito-Urinary ROS: no dysuria, trouble voiding, or hematuria  Musculoskeletal ROS: negative  Neurological ROS: no TIA or stroke symptoms  Dermatological ROS: negative    Objective:  Visit Vitals    BP (!) 137/94    Pulse 62    Temp 97.8 °F (36.6 °C) (Oral)    Resp 16    Ht 6' 1\" (1.854 m)    Wt 150 lb (68 kg)    SpO2 97%    BMI 19.79 kg/m2     The physical exam is generally normal. He appears well, alert and oriented x 3, pleasant and cooperative. Vitals as noted. ENT normal, neck supple and free of adenopathy, or masses. No thyromegaly or carotid bruits. Cranial nerves and fundi normal. Lungs are clear to auscultation. Heart sounds are normal, no murmurs, clicks, gallops or rubs. Abdomen is soft, no tenderness, masses or organomegaly. Extremities, peripheral pulses and reflexes are normal.  Screening neurological exam is normal without focal findings. Skin is normal without suspicious lesions. Assessment/Plan:  Diagnoses and all orders for this visit:    1. Routine general medical examination at a health care facility  -     CBC WITH AUTOMATED DIFF  -     PSA SCREENING (SCREENING)  -     METABOLIC PANEL, COMPREHENSIVE  -     LIPID PANEL    2. Screening for colon cancer  -     OCCULT BLOOD, IMMUNOASSAY (FIT); Future    3. HTN (hypertension), benign  Typically low, elevated today. Pt will start monitoring at home. Encouraged pt to monitor BP at home, preferably in AM when first wakes up. Goal BP is < 130/90.    4. Screening for viral disease  -     HEPATITIS C AB    5. Mild intermittent reactive airway disease without complication  -     montelukast (SINGULAIR) 10 mg tablet; Take 1 Tab by mouth daily. -     albuterol sulfate 90 mcg/actuation aepb; Take 2 Puffs by inhalation four (4) times daily as needed. Stable, refills provided.      Discussed importance of healthy diet and regular exercise, recommended multivitamin and sunscreen usage. Encouraged monthly self breast/testicular exam.      Follow-up Disposition:  Return if symptoms worsen or fail to improve.     Ervin Sinclair NP

## 2018-04-12 NOTE — PATIENT INSTRUCTIONS

## 2018-04-13 LAB
ALBUMIN SERPL-MCNC: 4.4 G/DL (ref 3.6–4.8)
ALBUMIN/GLOB SERPL: 2 {RATIO} (ref 1.2–2.2)
ALP SERPL-CCNC: 70 IU/L (ref 39–117)
ALT SERPL-CCNC: 21 IU/L (ref 0–44)
AST SERPL-CCNC: 21 IU/L (ref 0–40)
BASOPHILS # BLD AUTO: 0 X10E3/UL (ref 0–0.2)
BASOPHILS NFR BLD AUTO: 1 %
BILIRUB SERPL-MCNC: 0.6 MG/DL (ref 0–1.2)
BUN SERPL-MCNC: 13 MG/DL (ref 8–27)
BUN/CREAT SERPL: 13 (ref 10–24)
CALCIUM SERPL-MCNC: 9.5 MG/DL (ref 8.6–10.2)
CHLORIDE SERPL-SCNC: 100 MMOL/L (ref 96–106)
CHOLEST SERPL-MCNC: 260 MG/DL (ref 100–199)
CO2 SERPL-SCNC: 25 MMOL/L (ref 18–29)
CREAT SERPL-MCNC: 0.97 MG/DL (ref 0.76–1.27)
EOSINOPHIL # BLD AUTO: 0.5 X10E3/UL (ref 0–0.4)
EOSINOPHIL NFR BLD AUTO: 11 %
ERYTHROCYTE [DISTWIDTH] IN BLOOD BY AUTOMATED COUNT: 13.1 % (ref 12.3–15.4)
GFR SERPLBLD CREATININE-BSD FMLA CKD-EPI: 83 ML/MIN/1.73
GFR SERPLBLD CREATININE-BSD FMLA CKD-EPI: 96 ML/MIN/1.73
GLOBULIN SER CALC-MCNC: 2.2 G/DL (ref 1.5–4.5)
GLUCOSE SERPL-MCNC: 97 MG/DL (ref 65–99)
HCT VFR BLD AUTO: 44.4 % (ref 37.5–51)
HCV AB S/CO SERPL IA: <0.1 S/CO RATIO (ref 0–0.9)
HDLC SERPL-MCNC: 60 MG/DL
HGB BLD-MCNC: 15.2 G/DL (ref 13–17.7)
IMM GRANULOCYTES # BLD: 0 X10E3/UL (ref 0–0.1)
IMM GRANULOCYTES NFR BLD: 0 %
INTERPRETATION, 910389: NORMAL
LDLC SERPL CALC-MCNC: 185 MG/DL (ref 0–99)
LYMPHOCYTES # BLD AUTO: 1.2 X10E3/UL (ref 0.7–3.1)
LYMPHOCYTES NFR BLD AUTO: 27 %
MCH RBC QN AUTO: 31.3 PG (ref 26.6–33)
MCHC RBC AUTO-ENTMCNC: 34.2 G/DL (ref 31.5–35.7)
MCV RBC AUTO: 91 FL (ref 79–97)
MONOCYTES # BLD AUTO: 0.4 X10E3/UL (ref 0.1–0.9)
MONOCYTES NFR BLD AUTO: 9 %
NEUTROPHILS # BLD AUTO: 2.4 X10E3/UL (ref 1.4–7)
NEUTROPHILS NFR BLD AUTO: 52 %
PLATELET # BLD AUTO: 250 X10E3/UL (ref 150–379)
POTASSIUM SERPL-SCNC: 4.6 MMOL/L (ref 3.5–5.2)
PROT SERPL-MCNC: 6.6 G/DL (ref 6–8.5)
PSA SERPL-MCNC: 0.6 NG/ML (ref 0–4)
RBC # BLD AUTO: 4.86 X10E6/UL (ref 4.14–5.8)
SODIUM SERPL-SCNC: 140 MMOL/L (ref 134–144)
TRIGL SERPL-MCNC: 77 MG/DL (ref 0–149)
VLDLC SERPL CALC-MCNC: 15 MG/DL (ref 5–40)
WBC # BLD AUTO: 4.5 X10E3/UL (ref 3.4–10.8)

## 2018-04-13 NOTE — PROGRESS NOTES
Elevated cholesterol - recommend OTC Red Rice Yeast or statin therapy in addition to life style changes.    All else normal.

## 2018-05-08 ENCOUNTER — DOCUMENTATION ONLY (OUTPATIENT)
Dept: FAMILY MEDICINE CLINIC | Age: 63
End: 2018-05-08

## 2018-05-08 NOTE — PROGRESS NOTES
Faxed 04/12/18 office notes/lab results to Dr Tonia Rod @[facility] per request. Fax #[phone] confirmation received

## 2018-05-24 ENCOUNTER — ANESTHESIA EVENT (OUTPATIENT)
Dept: ENDOSCOPY | Age: 63
End: 2018-05-24
Payer: SUBSIDIZED

## 2018-05-24 ENCOUNTER — ANESTHESIA (OUTPATIENT)
Dept: ENDOSCOPY | Age: 63
End: 2018-05-24
Payer: SUBSIDIZED

## 2018-05-24 ENCOUNTER — HOSPITAL ENCOUNTER (OUTPATIENT)
Age: 63
Setting detail: OUTPATIENT SURGERY
Discharge: HOME | End: 2018-05-24
Attending: INTERNAL MEDICINE | Admitting: INTERNAL MEDICINE
Payer: SUBSIDIZED

## 2018-05-24 VITALS
DIASTOLIC BLOOD PRESSURE: 44 MMHG | BODY MASS INDEX: 19.29 KG/M2 | SYSTOLIC BLOOD PRESSURE: 134 MMHG | OXYGEN SATURATION: 95 % | HEIGHT: 73 IN | TEMPERATURE: 97.5 F | WEIGHT: 145.56 LBS | HEART RATE: 72 BPM | RESPIRATION RATE: 15 BRPM

## 2018-05-24 PROCEDURE — 74011250636 HC RX REV CODE- 250/636: Performed by: INTERNAL MEDICINE

## 2018-05-24 PROCEDURE — 74011250636 HC RX REV CODE- 250/636

## 2018-05-24 PROCEDURE — 77030009426 HC FCPS BIOP ENDOSC BSC -B: Performed by: INTERNAL MEDICINE

## 2018-05-24 PROCEDURE — 76060000032 HC ANESTHESIA 0.5 TO 1 HR: Performed by: INTERNAL MEDICINE

## 2018-05-24 PROCEDURE — 77030027957 HC TBNG IRR ENDOGTR BUSS -B: Performed by: INTERNAL MEDICINE

## 2018-05-24 PROCEDURE — 88305 TISSUE EXAM BY PATHOLOGIST: CPT | Performed by: INTERNAL MEDICINE

## 2018-05-24 PROCEDURE — 77030011640 HC PAD GRND REM COVD -A: Performed by: INTERNAL MEDICINE

## 2018-05-24 PROCEDURE — 76040000007: Performed by: INTERNAL MEDICINE

## 2018-05-24 RX ORDER — FENTANYL CITRATE 50 UG/ML
200 INJECTION, SOLUTION INTRAMUSCULAR; INTRAVENOUS
Status: DISCONTINUED | OUTPATIENT
Start: 2018-05-24 | End: 2018-05-24 | Stop reason: HOSPADM

## 2018-05-24 RX ORDER — SODIUM CHLORIDE 0.9 % (FLUSH) 0.9 %
5-10 SYRINGE (ML) INJECTION AS NEEDED
Status: DISCONTINUED | OUTPATIENT
Start: 2018-05-24 | End: 2018-05-24 | Stop reason: HOSPADM

## 2018-05-24 RX ORDER — PROPOFOL 10 MG/ML
INJECTION, EMULSION INTRAVENOUS AS NEEDED
Status: DISCONTINUED | OUTPATIENT
Start: 2018-05-24 | End: 2018-05-24 | Stop reason: HOSPADM

## 2018-05-24 RX ORDER — NALOXONE HYDROCHLORIDE 0.4 MG/ML
0.4 INJECTION, SOLUTION INTRAMUSCULAR; INTRAVENOUS; SUBCUTANEOUS
Status: DISCONTINUED | OUTPATIENT
Start: 2018-05-24 | End: 2018-05-24 | Stop reason: HOSPADM

## 2018-05-24 RX ORDER — ATROPINE SULFATE 0.1 MG/ML
0.5 INJECTION INTRAVENOUS
Status: DISCONTINUED | OUTPATIENT
Start: 2018-05-24 | End: 2018-05-24 | Stop reason: HOSPADM

## 2018-05-24 RX ORDER — MIDAZOLAM HYDROCHLORIDE 1 MG/ML
.25-1 INJECTION, SOLUTION INTRAMUSCULAR; INTRAVENOUS
Status: DISCONTINUED | OUTPATIENT
Start: 2018-05-24 | End: 2018-05-24 | Stop reason: HOSPADM

## 2018-05-24 RX ORDER — DEXTROMETHORPHAN/PSEUDOEPHED 2.5-7.5/.8
1.2 DROPS ORAL
Status: DISCONTINUED | OUTPATIENT
Start: 2018-05-24 | End: 2018-05-24 | Stop reason: HOSPADM

## 2018-05-24 RX ORDER — FLUMAZENIL 0.1 MG/ML
0.2 INJECTION INTRAVENOUS
Status: DISCONTINUED | OUTPATIENT
Start: 2018-05-24 | End: 2018-05-24 | Stop reason: HOSPADM

## 2018-05-24 RX ORDER — SODIUM CHLORIDE 0.9 % (FLUSH) 0.9 %
5-10 SYRINGE (ML) INJECTION EVERY 8 HOURS
Status: DISCONTINUED | OUTPATIENT
Start: 2018-05-24 | End: 2018-05-24 | Stop reason: HOSPADM

## 2018-05-24 RX ORDER — EPINEPHRINE 0.1 MG/ML
1 INJECTION INTRACARDIAC; INTRAVENOUS
Status: DISCONTINUED | OUTPATIENT
Start: 2018-05-24 | End: 2018-05-24 | Stop reason: HOSPADM

## 2018-05-24 RX ORDER — SODIUM CHLORIDE 9 MG/ML
100 INJECTION, SOLUTION INTRAVENOUS CONTINUOUS
Status: DISCONTINUED | OUTPATIENT
Start: 2018-05-24 | End: 2018-05-24 | Stop reason: HOSPADM

## 2018-05-24 RX ORDER — SODIUM CHLORIDE 9 MG/ML
INJECTION, SOLUTION INTRAVENOUS
Status: DISCONTINUED | OUTPATIENT
Start: 2018-05-24 | End: 2018-05-24 | Stop reason: HOSPADM

## 2018-05-24 RX ADMIN — PROPOFOL 50 MG: 10 INJECTION, EMULSION INTRAVENOUS at 15:41

## 2018-05-24 RX ADMIN — PROPOFOL 50 MG: 10 INJECTION, EMULSION INTRAVENOUS at 15:50

## 2018-05-24 RX ADMIN — PROPOFOL 50 MG: 10 INJECTION, EMULSION INTRAVENOUS at 15:34

## 2018-05-24 RX ADMIN — PROPOFOL 50 MG: 10 INJECTION, EMULSION INTRAVENOUS at 15:46

## 2018-05-24 RX ADMIN — PROPOFOL 50 MG: 10 INJECTION, EMULSION INTRAVENOUS at 15:38

## 2018-05-24 RX ADMIN — SODIUM CHLORIDE: 9 INJECTION, SOLUTION INTRAVENOUS at 15:15

## 2018-05-24 RX ADMIN — PROPOFOL 50 MG: 10 INJECTION, EMULSION INTRAVENOUS at 15:30

## 2018-05-24 NOTE — ROUTINE PROCESS
Cosmo Corley  1955  [id number]    Situation:  Verbal report received from: [facility] [place]  Procedure: Procedure(s):  COLONOSCOPY  ENDOSCOPIC POLYPECTOMY    Background:    Preoperative diagnosis: HEME POSITIVE STOOL  Postoperative diagnosis: 1.- Diverticulosis  2.- Colon Polyps    :  Dr. Sunil Moon  Assistant(s): Endoscopy Technician-1: Eddie Hester  Endoscopy RN-1: Fredy Talavera RN    Specimens:   ID Type Source Tests Collected by Time Destination   1 : Sigmoid Colon Polyp Preservative   Maritza Sutton MD 5/24/2018 1601 Pathology   2 : Rectal Polyp Preservative   Trey Roche MD 5/24/2018 4482 Pathology     H. Pylori  no    Assessment:  Intra-procedure medications   Anesthesia gave intra-procedure sedation and medications, see anesthesia flow sheet yes    Intravenous fluids: NS@ KVO     Vital signs stable     Abdominal assessment: round and soft     Recommendation:  Discharge patient per MD order.     Family or Friend   Permission to share finding with family or friend yes

## 2018-05-24 NOTE — ANESTHESIA PREPROCEDURE EVALUATION
Anesthetic History   No history of anesthetic complications            Review of Systems / Medical History  Patient summary reviewed, nursing notes reviewed and pertinent labs reviewed    Pulmonary    COPD: mild               Neuro/Psych   Within defined limits           Cardiovascular    Hypertension: well controlled              Exercise tolerance: >4 METS     GI/Hepatic/Renal  Within defined limits              Endo/Other  Within defined limits           Other Findings              Physical Exam    Airway  Mallampati: II  TM Distance: 4 - 6 cm  Neck ROM: normal range of motion   Mouth opening: Normal     Cardiovascular  Regular rate and rhythm,  S1 and S2 normal,  no murmur, click, rub, or gallop             Dental    Dentition: Full upper dentures and Poor dentition     Pulmonary  Breath sounds clear to auscultation               Abdominal  GI exam deferred       Other Findings            Anesthetic Plan    ASA: 2  Anesthesia type: MAC          Induction: Intravenous  Anesthetic plan and risks discussed with: Patient

## 2018-05-24 NOTE — IP AVS SNAPSHOT
Summary of Care Report The Summary of Care report has been created to help improve care coordination. Users with access to [employer] or [address] (Web-based application) may access additional patient information including the Discharge Summary. If you are not currently a [address] user and need more information, please call the number listed below in the [place] [address] section and ask to be connected with Medical Records. Facility Information Name Address Phone [address] [phone] Patient Information Patient Name Sex ARTURO Shah ([id number]) Male 1955 Discharge Information Admitting Provider Service Area Unit China Torres MD / [address] [facility] [facility] / [phone] Discharge Provider Discharge Date/Time Discharge Disposition Destination (none) (none) (none) (none) Patient Language Language ENGLISH [13] Hospital Problems as of 2018  Reviewed: 2018  2:59 PM by Collins Landon MD  
 None Non-Hospital Problems as of 2018  Reviewed: 2018  2:59 PM by Collins Landon MD  
  
  
  
 Class Noted - Resolved Last Modified Active Problems Dyspnea  2017 - Present 2017 by Mara Ramirez MD  
  Entered by Mara Ramirez MD  
  HTN (hypertension), benign  2017 - Present 2017 by Mara Ramirez MD  
  Entered by Mara Ramirez MD  
  Hypoxia  2017 - Present 2017 by Mara Ramirez MD  
  Entered by Mara Ramirez MD  
  
You are allergic to the following No active allergies Current Discharge Medication List  
  
CONTINUE these medications which have NOT CHANGED Dose & Instructions Dispensing Information Comments  
 albuterol sulfate 90 mcg/actuation Aepb Dose:  2 Puff Take 2 Puffs by inhalation four (4) times daily as needed. Quantity:  1 Inhaler Refills:  2  
   
 montelukast 10 mg tablet Commonly known as:  SINGULAIR Dose:  10 mg Take 1 Tab by mouth daily. Quantity:  90 Tab Refills:  3  
   
 multivitamin tablet Commonly known as:  ONE A DAY Dose:  1 Tab Take 1 Tab by mouth daily. Refills:  0  
   
 OTHER Olive leaf one capsule by mouth daily, Biocleanse one capsule by mouth every evening Refills:  0 PROBIOTIC 4X 10-15 mg Tbec Generic drug:  B.infantis-B.ani-B.long-B.bifi Dose:  1 Cap Take 1 Cap by mouth nightly. Refills:  0  
   
 VITAMIN C 1,000 mg tablet Generic drug:  ascorbic acid (vitamin C) Dose:  1000 mg Take 1,000 mg by mouth nightly. Refills:  0 Surgery Information ID Date/Time Status Primary Surgeon All Procedures Location [id number] 5/24/2018 1530 Unposted Maribel Pickering MD COLONOSCOPY 
ENDOSCOPIC POLYPECTOMY [address] [place] [address] ENDOSCOPY Follow-up Information None Discharge Instructions [address] 
[address] [facility], [address] [place] [address] [facility] [id number] 
1955 Discomfort: 
Redness at IV site- apply warm compress to area; if redness or soreness persist- contact your physician There may be a slight amount of blood passed from the rectum Gaseous discomfort- walking, belching will help relieve any discomfort You may not operate a vehicle for 12 hours You may not engage in an occupation involving machinery or appliances for rest of today You may not drink alcoholic beverages for at least 12 hours Avoid making any critical decisions for at least 24 hour DIET: 
You may resume your regular diet  however -  remember your colon is empty and a heavy meal will produce gas. Avoid these foods:  vegetables, fried / greasy foods, carbonated drinks ACTIVITY: 
 You may  resume your normal daily activities it is recommended that you spend the remainder of the day resting -  avoid any strenuous activity. CALL M.D. ANY SIGN OF: Increasing pain, nausea, vomiting Abdominal distension (swelling) New increased bleeding (oral or rectal) Fever (chills) Pain in chest area Bloody discharge from nose or mouth Shortness of breath Follow-up Instructions: 
 Call Dr. Mavis Montiel for any questions or problems at [phone] High fiber diet ENDOSCOPY FINDINGS: 
 Your colonoscopy showed 2 small polyps I removed, and mild diverticulosis. Telephone # [phone] Signed By: Mavis Montiel MD   
 5/24/2018  4:14 PM 
  
 
DISCHARGE SUMMARY from Nurse The following personal items collected during your admission are returned to you:  
Dental Appliance: Dental Appliances: None Vision: Visual Aid: Glasses, At bedside Hearing Aid:   
Jewelry:   
Clothing:   
Other Valuables:   
Valuables sent to safe:   
 
 
 
Chart Review Routing History Recipient Method Report Sent By Bryan Alvarez Provider Bradley, MD  
Patient not available to ask [address] IP Auto Routed Notes Odalys Cortes MD [[id number]] 12/7/2017 10:53 AM 12/07/2017 Provider Unknown, MD  
Patient not available to ask [address] IP Auto Routed Notes Odalys Cortes MD [[id number]] 12/7/2017 12:39 PM 12/07/2017 Provider Unknown, MD  
Patient not available to ask [address] IP Auto Routed Notes Odalys Cortes MD [[id number]] 12/8/2017  2:09 PM 12/08/2017

## 2018-05-24 NOTE — ANESTHESIA POSTPROCEDURE EVALUATION
Post-Anesthesia Evaluation and Assessment    Patient: Harrison Olivarez MRN: [id number]  SSN: [id number]    YOB: 1955  Age: 60 y.o. Sex: male       Cardiovascular Function/Vital Signs  Visit Vitals    BP 99/55    Pulse 88    Temp 36.5 °C (97.7 °F)    Resp 25    Ht 6' 1\" (1.854 m)    Wt 66 kg (145 lb 9 oz)    SpO2 99%    BMI 19.2 kg/m2       Patient is status post MAC anesthesia for Procedure(s):  COLONOSCOPY  ENDOSCOPIC POLYPECTOMY. Nausea/Vomiting: None    Postoperative hydration reviewed and adequate. Pain:  Pain Scale 1: Numeric (0 - 10) (05/24/18 1610)  Pain Intensity 1: 0 (05/24/18 1610)   Managed    Neurological Status: At baseline    Mental Status and Level of Consciousness: Arousable    Pulmonary Status:   O2 Device: Room air (05/24/18 1454)   Adequate oxygenation and airway patent    Complications related to anesthesia: None    Post-anesthesia assessment completed.  No concerns    Signed By: Roger Navarro MD     May 24, 2018

## 2018-05-24 NOTE — IP AVS SNAPSHOT
[address] 
[phone] Patient: Harrison Olivarez MRN: [id number] About your hospitalization You were admitted on:  May 24, 2018 You last received care in the:  [facility] ENDOSCOPY You were discharged on:  May 24, 2018 Why you were hospitalized Your primary diagnosis was:  Not on File Follow-up Information None Discharge Orders None A check celestina indicates which time of day the medication should be taken. My Medications CONTINUE taking these medications Instructions Each Dose to Equal  
 Morning Noon Evening Bedtime  
 albuterol sulfate 90 mcg/actuation Aepb Your last dose was: Your next dose is: Take 2 Puffs by inhalation four (4) times daily as needed. 2 Puff  
    
   
   
   
  
 montelukast 10 mg tablet Commonly known as:  SINGULAIR Your last dose was: Your next dose is: Take 1 Tab by mouth daily. 10 mg  
    
   
   
   
  
 multivitamin tablet Commonly known as:  ONE A DAY Your last dose was: Your next dose is: Take 1 Tab by mouth daily. 1 Tab OTHER Your last dose was: Your next dose is:    
   
   
 Olive leaf one capsule by mouth daily, Biocleanse one capsule by mouth every evening PROBIOTIC 4X 10-15 mg Tbec Generic drug:  B.infantis-B.ani-B.long-B.bifi Your last dose was: Your next dose is: Take 1 Cap by mouth nightly. 1 Cap VITAMIN C 1,000 mg tablet Generic drug:  ascorbic acid (vitamin C) Your last dose was: Your next dose is: Take 1,000 mg by mouth nightly. 1000 mg Discharge Instructions Radha 63 
[address] COLON DISCHARGE INSTRUCTIONS Lily Clarke [id number] 
1955 Discomfort: 
Redness at IV site- apply warm compress to area; if redness or soreness persist- contact your physician There may be a slight amount of blood passed from the rectum Gaseous discomfort- walking, belching will help relieve any discomfort You may not operate a vehicle for 12 hours You may not engage in an occupation involving machinery or appliances for rest of today You may not drink alcoholic beverages for at least 12 hours Avoid making any critical decisions for at least 24 hour DIET: 
You may resume your regular diet  however -  remember your colon is empty and a heavy meal will produce gas. Avoid these foods:  vegetables, fried / greasy foods, carbonated drinks ACTIVITY: 
You may  resume your normal daily activities it is recommended that you spend the remainder of the day resting -  avoid any strenuous activity. CALL M.D. ANY SIGN OF: Increasing pain, nausea, vomiting Abdominal distension (swelling) New increased bleeding (oral or rectal) Fever (chills) Pain in chest area Bloody discharge from nose or mouth Shortness of breath Follow-up Instructions: 
 Call Dr. Nery Sherman for any questions or problems at [phone] High fiber diet ENDOSCOPY FINDINGS: 
 Your colonoscopy showed 2 small polyps I removed, and mild diverticulosis. Telephone # [phone] Signed By: Nery Sherman MD   
 5/24/2018  4:14 PM 
  
 
DISCHARGE SUMMARY from Nurse The following personal items collected during your admission are returned to you:  
Dental Appliance: Dental Appliances: None Vision: Visual Aid: Glasses, At bedside Hearing Aid:   
Jewelry:   
Clothing:   
Other Valuables:   
Valuables sent to safe:   
 
 
 
  
  
  
Introducing [facility]! Dear José Blankenship: Thank you for requesting a [employer] account.   Our records indicate that you already have an active [employer] account. You can access your account anytime at [email or web address] Did you know that you can access your hospital and ER discharge instructions at any time in [employer]ie? You can also review all of your test results from your hospital stay or ER visit. Additional Information If you have questions, please visit the Frequently Asked Questions section of the Ugenie website at [email or web address] Remember, Ugenie is NOT to be used for urgent needs. For medical emergencies, dial 911. Now available from your iPhone and Android! Introducing Esteban Engel As a [facility] patient, I wanted to make you aware of our electronic visit tool called Esteban Engel. [facility] 24/7 allows you to connect within minutes with a medical provider 24 hours a day, seven days a week via a mobile device or tablet or logging into a secure website from your computer. You can access Esteban Engel from anywhere in the [place]. A virtual visit might be right for you when you have a simple condition and feel like you just dont want to get out of bed, or cant get away from work for an appointment, when your regular [facility] provider is not available (evenings, weekends or holidays), or when youre out of town and need minor care. Electronic visits cost only $49 and if the [facility] [place] 24/7 provider determines a prescription is needed to treat your condition, one can be electronically transmitted to a nearby pharmacy*. Please take a moment to enroll today if you have not already done so. The enrollment process is free and takes just a few minutes. To enroll, please download the [facility] 24/7 sherita to your tablet or phone, or visit [email or web address] to enroll on your computer.    
And, as an [address] [place] [address] patient with a [employer] account, the results of your visits will be scanned into your electronic medical record and your primary care provider will be able to view the scanned results. We urge you to continue to see your regular Jayesh Lessen provider for your ongoing medical care. And while your primary care provider may not be the one available when you seek a Esteban Derasfin virtual visit, the peace of mind you get from getting a real diagnosis real time can be priceless. For more information on Esteban Yummlygabriellefin, view our Frequently Asked Questions (FAQs) at [email or web address]. Sincerely, 
 
Veronique Flores MD 
Chief Medical Officer [address] *:  certain medications cannot be prescribed via [employer] Providers Seen During Your Hospitalization Provider Specialty Primary office phone Amisha Reina MD Gastroenterology [phone] Your Primary Care Physician (PCP) Primary Care Physician Office Phone Office Fax UNKNOWN, PROVIDER ** None ** ** None ** You are allergic to the following No active allergies Recent Documentation Height Weight BMI Smoking Status 1.854 m 66 kg 19.2 kg/m2 Former Smoker Emergency Contacts Name Discharge Info Relation Home Work Mobile Mago Charles DISCHARGE CAREGIVER [3] Spouse [3] [phone] Patient Belongings The following personal items are in your possession at time of discharge: 
  Dental Appliances: None  Visual Aid: Glasses, At bedside Please provide this summary of care documentation to your next provider. Signatures-by signing, you are acknowledging that this After Visit Summary has been reviewed with you and you have received a copy. Patient Signature:  ____________________________________________________________  Date:  ____________________________________________________________  
  
Fiordaliza Gonzalez    
    
 Provider Signature:  ____________________________________________________________ Date:  ____________________________________________________________

## 2018-05-24 NOTE — DISCHARGE INSTRUCTIONS
[address], [address] [place] [address]    COLON DISCHARGE INSTRUCTIONS    Regine Alicia  [id number]  1955    Discomfort:  Redness at IV site- apply warm compress to area; if redness or soreness persist- contact your physician  There may be a slight amount of blood passed from the rectum  Gaseous discomfort- walking, belching will help relieve any discomfort  You may not operate a vehicle for 12 hours  You may not engage in an occupation involving machinery or appliances for rest of today  You may not drink alcoholic beverages for at least 12 hours  Avoid making any critical decisions for at least 24 hour  DIET:  You may resume your regular diet - however -  remember your colon is empty and a heavy meal will produce gas. Avoid these foods:  vegetables, fried / greasy foods, carbonated drinks     ACTIVITY:  You may  resume your normal daily activities it is recommended that you spend the remainder of the day resting -  avoid any strenuous activity. CALL M.D. ANY SIGN OF:   Increasing pain, nausea, vomiting  Abdominal distension (swelling)  New increased bleeding (oral or rectal)  Fever (chills)  Pain in chest area  Bloody discharge from nose or mouth  Shortness of breath      Follow-up Instructions:   Call Dr. Rylan Rinaldi for any questions or problems at [phone]   High fiber diet          ENDOSCOPY FINDINGS:   Your colonoscopy showed 2 small polyps I removed, and mild diverticulosis.   Telephone # [phone]      Signed By: Rylan Rinaldi MD     5/24/2018  4:14 PM       DISCHARGE SUMMARY from Nurse    The following personal items collected during your admission are returned to you:   Dental Appliance: Dental Appliances: None  Vision: Visual Aid: Glasses, At bedside  Hearing Aid:    Jewelry:    Clothing:    Other Valuables:    Valuables sent to safe:

## 2018-05-24 NOTE — PROCEDURES
[address], [address] [place] [address]      Colonoscopy Operative Report    Regine Alicia  [id number]  1955      Procedure Type:   Colonoscopy with polypectomy (hot biopsy)     Indications:    Occult blood in stool   First colonoscopy     Pre-operative Diagnosis: see indication above    Post-operative Diagnosis:  See findings below    :  Rylan Rinaldi MD      Referring Provider: PROVIDER UNKNOWN      Sedation:  MAC anesthesia Propofol      Procedure Details:  After informed consent was obtained with all risks and benefits of procedure explained and preoperative exam completed, the patient was taken to the endoscopy suite and placed in the left lateral decubitus position. Upon sequential sedation as per above, a digital rectal exam was performed demonstrating internal hemorrhoids. The Olympus videocolonoscope  was inserted in the rectum and carefully advanced to the cecum, which was identified by the ileocecal valve and appendiceal orifice. The cecum was identified by the ileocecal valve and appendiceal orifice. The quality of preparation was excellent. The colonoscope was slowly withdrawn with careful evaluation between folds. Retroflexion in the rectum was completed . Findings:   Rectum: normal  7 mm polyp removed by hot biopsy  Sigmoid: mild diverticulosis    9 mm polyp removed by hot biopsies    Descending Colon: mild diverticulosis  Transverse Colon: normal  Ascending Colon: normal  Cecum: normal        Specimen Removed:  as above    Complications: None. EBL:  None. Impression:    see findings    Recommendations: --Await pathology.       Recommendation for next colonscopy in 5 years  High fiber diet    Signed By: Rylan Rinaldi MD     5/24/2018  4:09 PM

## 2019-09-16 DIAGNOSIS — J45.20 MILD INTERMITTENT REACTIVE AIRWAY DISEASE WITHOUT COMPLICATION: ICD-10-CM

## 2019-09-16 RX ORDER — MONTELUKAST SODIUM 10 MG/1
10 TABLET ORAL DAILY
Qty: 90 TAB | Refills: 3 | Status: SHIPPED | OUTPATIENT
Start: 2019-09-16

## (undated) DEVICE — BW-412T DISP COMBO CLEANING BRUSH: Brand: SINGLE USE COMBINATION CLEANING BRUSH

## (undated) DEVICE — CONNECTOR TBNG AUX H2O JET DISP FOR OLY 160/180 SER

## (undated) DEVICE — QUILTED PREMIUM COMFORT UNDERPAD,EXTRA HEAVY: Brand: WINGS

## (undated) DEVICE — Z DISCONTINUED USE 2751540 TUBING IRRIG L10IN DISP PMP ENDOGATOR

## (undated) DEVICE — NEONATAL-ADULT SPO2 SENSOR: Brand: NELLCOR

## (undated) DEVICE — REM POLYHESIVE ADULT PATIENT RETURN ELECTRODE: Brand: VALLEYLAB

## (undated) DEVICE — AIRLIFE™ U/CONNECT-IT OXYGEN TUBING 7 FEET (2.1 M) CRUSH-RESISTANT OXYGEN TUBING, VINYL TIPPED: Brand: AIRLIFE™

## (undated) DEVICE — CATH IV AUTOGRD BC BLU 22GA 25 -- INSYTE

## (undated) DEVICE — CONTAINER SPEC 20 ML LID NEUT BUFF FORMALIN 10 % POLYPR STS

## (undated) DEVICE — FCPS BX HOT RJ4 2.2MMX240CM -- RADIAL JAW 4 BX/40

## (undated) DEVICE — KENDALL RADIOLUCENT FOAM MONITORING ELECTRODE -RECTANGULAR SHAPE: Brand: KENDALL

## (undated) DEVICE — Z DISCONTINUED NO SUB IDED SET EXTN W/ 4 W STPCOCK M SPIN LOK 36IN

## (undated) DEVICE — TRAP SURG QUAD PARABOLA SLOT DSGN SFTY SCRN TRAPEASE

## (undated) DEVICE — SYRINGE MED 20ML STD CLR PLAS LUERLOCK TIP N CTRL DISP

## (undated) DEVICE — CUFF BLD PRSS CHILD SZ 9 FOR 15-21CM LIMB VYN SFT W/O TB

## (undated) DEVICE — NEEDLE HYPO 18GA L1.5IN PNK S STL HUB POLYPR SHLD REG BVL

## (undated) DEVICE — 1200 GUARD II KIT W/5MM TUBE W/O VAC TUBE: Brand: GUARDIAN

## (undated) DEVICE — BAG SPEC BIOHZD LF 2MIL 6X10IN -- CONVERT TO ITEM 357326

## (undated) DEVICE — SET ADMIN 16ML TBNG L100IN 2 Y INJ SITE IV PIGGY BK DISP

## (undated) DEVICE — BAG BELONG PT PERS CLEAR HANDL

## (undated) DEVICE — KIT IV STRT W CHLORAPREP PD 1ML

## (undated) DEVICE — ENDO CARRY-ON PROCEDURE KIT INCLUDES ENZYMATIC SPONGE, GAUZE, BIOHAZARD LABEL, TRAY, LUBRICANT, DIRTY SCOPE LABEL, WATER LABEL, TRAY, DRAWSTRING PAD, AND DEFENDO 4-PIECE KIT.: Brand: ENDO CARRY-ON PROCEDURE KIT

## (undated) DEVICE — SOLIDIFIER FLUID 3000 CC ABSORB

## (undated) DEVICE — CANN NASAL O2 CAPNOGRAPHY AD -- FILTERLINE